# Patient Record
Sex: MALE | Race: WHITE | NOT HISPANIC OR LATINO | Employment: OTHER | URBAN - METROPOLITAN AREA
[De-identification: names, ages, dates, MRNs, and addresses within clinical notes are randomized per-mention and may not be internally consistent; named-entity substitution may affect disease eponyms.]

---

## 2021-09-11 ENCOUNTER — TELEPHONE (OUTPATIENT)
Dept: CARDIOLOGY | Facility: MEDICAL CENTER | Age: 86
End: 2021-09-11

## 2021-09-11 ENCOUNTER — HOSPITAL ENCOUNTER (INPATIENT)
Facility: MEDICAL CENTER | Age: 86
LOS: 3 days | DRG: 308 | End: 2021-09-16
Attending: HOSPITALIST | Admitting: FAMILY MEDICINE
Payer: MEDICARE

## 2021-09-11 DIAGNOSIS — R00.1 SYMPTOMATIC BRADYCARDIA: ICD-10-CM

## 2021-09-11 DIAGNOSIS — M62.81 GENERALIZED MUSCLE WEAKNESS: ICD-10-CM

## 2021-09-11 PROBLEM — E03.9 HYPOTHYROIDISM: Status: ACTIVE | Noted: 2021-09-11

## 2021-09-11 PROBLEM — I10 ESSENTIAL HYPERTENSION: Status: ACTIVE | Noted: 2021-09-11

## 2021-09-11 PROBLEM — N40.0 BENIGN PROSTATIC HYPERPLASIA: Status: ACTIVE | Noted: 2021-09-11

## 2021-09-11 PROBLEM — E87.1 HYPONATREMIA: Status: ACTIVE | Noted: 2021-09-11

## 2021-09-11 PROCEDURE — A9270 NON-COVERED ITEM OR SERVICE: HCPCS | Performed by: STUDENT IN AN ORGANIZED HEALTH CARE EDUCATION/TRAINING PROGRAM

## 2021-09-11 PROCEDURE — 700102 HCHG RX REV CODE 250 W/ 637 OVERRIDE(OP): Performed by: STUDENT IN AN ORGANIZED HEALTH CARE EDUCATION/TRAINING PROGRAM

## 2021-09-11 PROCEDURE — 99220 PR INITIAL OBSERVATION CARE,LEVL III: CPT | Performed by: STUDENT IN AN ORGANIZED HEALTH CARE EDUCATION/TRAINING PROGRAM

## 2021-09-11 PROCEDURE — G0378 HOSPITAL OBSERVATION PER HR: HCPCS

## 2021-09-11 RX ORDER — FINASTERIDE 5 MG/1
5 TABLET, FILM COATED ORAL DAILY
Status: DISCONTINUED | OUTPATIENT
Start: 2021-09-12 | End: 2021-09-16 | Stop reason: HOSPADM

## 2021-09-11 RX ORDER — AMIODARONE HYDROCHLORIDE 200 MG/1
200 TABLET ORAL DAILY
Status: ON HOLD | COMMUNITY
End: 2021-09-16

## 2021-09-11 RX ORDER — LOSARTAN POTASSIUM 50 MG/1
50 TABLET ORAL
Status: DISCONTINUED | OUTPATIENT
Start: 2021-09-12 | End: 2021-09-13

## 2021-09-11 RX ORDER — LEVOTHYROXINE SODIUM 112 UG/1
112 TABLET ORAL
Status: DISCONTINUED | OUTPATIENT
Start: 2021-09-12 | End: 2021-09-16 | Stop reason: HOSPADM

## 2021-09-11 RX ORDER — HYDROMORPHONE HYDROCHLORIDE 1 MG/ML
0.25 INJECTION, SOLUTION INTRAMUSCULAR; INTRAVENOUS; SUBCUTANEOUS
Status: DISCONTINUED | OUTPATIENT
Start: 2021-09-11 | End: 2021-09-13

## 2021-09-11 RX ORDER — OXYCODONE HYDROCHLORIDE 5 MG/1
2.5 TABLET ORAL
Status: DISCONTINUED | OUTPATIENT
Start: 2021-09-11 | End: 2021-09-13

## 2021-09-11 RX ORDER — POLYETHYLENE GLYCOL 3350 17 G/17G
1 POWDER, FOR SOLUTION ORAL
Status: DISCONTINUED | OUTPATIENT
Start: 2021-09-11 | End: 2021-09-16 | Stop reason: HOSPADM

## 2021-09-11 RX ORDER — ENALAPRILAT 1.25 MG/ML
1.25 INJECTION INTRAVENOUS EVERY 6 HOURS PRN
Status: DISCONTINUED | OUTPATIENT
Start: 2021-09-11 | End: 2021-09-16 | Stop reason: HOSPADM

## 2021-09-11 RX ORDER — TAMSULOSIN HYDROCHLORIDE 0.4 MG/1
0.4 CAPSULE ORAL
Status: DISCONTINUED | OUTPATIENT
Start: 2021-09-12 | End: 2021-09-16 | Stop reason: HOSPADM

## 2021-09-11 RX ORDER — OMEPRAZOLE 20 MG/1
20 CAPSULE, DELAYED RELEASE ORAL DAILY
Status: DISCONTINUED | OUTPATIENT
Start: 2021-09-11 | End: 2021-09-16 | Stop reason: HOSPADM

## 2021-09-11 RX ORDER — LABETALOL HYDROCHLORIDE 5 MG/ML
10 INJECTION, SOLUTION INTRAVENOUS EVERY 4 HOURS PRN
Status: DISCONTINUED | OUTPATIENT
Start: 2021-09-11 | End: 2021-09-15

## 2021-09-11 RX ORDER — CLONIDINE HYDROCHLORIDE 0.1 MG/1
0.1 TABLET ORAL TWICE DAILY
Status: DISCONTINUED | OUTPATIENT
Start: 2021-09-11 | End: 2021-09-16 | Stop reason: HOSPADM

## 2021-09-11 RX ORDER — ASPIRIN 81 MG/1
81 TABLET, CHEWABLE ORAL DAILY
Status: DISCONTINUED | OUTPATIENT
Start: 2021-09-12 | End: 2021-09-16 | Stop reason: HOSPADM

## 2021-09-11 RX ORDER — OXYCODONE HYDROCHLORIDE 5 MG/1
5 TABLET ORAL
Status: DISCONTINUED | OUTPATIENT
Start: 2021-09-11 | End: 2021-09-13

## 2021-09-11 RX ORDER — AMIODARONE HYDROCHLORIDE 200 MG/1
200 TABLET ORAL DAILY
Status: DISCONTINUED | OUTPATIENT
Start: 2021-09-12 | End: 2021-09-12

## 2021-09-11 RX ORDER — BISACODYL 10 MG
10 SUPPOSITORY, RECTAL RECTAL
Status: DISCONTINUED | OUTPATIENT
Start: 2021-09-11 | End: 2021-09-16 | Stop reason: HOSPADM

## 2021-09-11 RX ORDER — ONDANSETRON 4 MG/1
4 TABLET, ORALLY DISINTEGRATING ORAL EVERY 4 HOURS PRN
Status: DISCONTINUED | OUTPATIENT
Start: 2021-09-11 | End: 2021-09-16 | Stop reason: HOSPADM

## 2021-09-11 RX ORDER — ACETAMINOPHEN 325 MG/1
650 TABLET ORAL EVERY 6 HOURS PRN
Status: DISCONTINUED | OUTPATIENT
Start: 2021-09-11 | End: 2021-09-16 | Stop reason: HOSPADM

## 2021-09-11 RX ORDER — ONDANSETRON 2 MG/ML
4 INJECTION INTRAMUSCULAR; INTRAVENOUS EVERY 4 HOURS PRN
Status: DISCONTINUED | OUTPATIENT
Start: 2021-09-11 | End: 2021-09-16 | Stop reason: HOSPADM

## 2021-09-11 RX ORDER — HEPARIN SODIUM 5000 [USP'U]/ML
5000 INJECTION, SOLUTION INTRAVENOUS; SUBCUTANEOUS EVERY 8 HOURS
Status: DISCONTINUED | OUTPATIENT
Start: 2021-09-12 | End: 2021-09-16 | Stop reason: HOSPADM

## 2021-09-11 RX ORDER — AMOXICILLIN 250 MG
2 CAPSULE ORAL 2 TIMES DAILY
Status: DISCONTINUED | OUTPATIENT
Start: 2021-09-12 | End: 2021-09-16 | Stop reason: HOSPADM

## 2021-09-11 RX ADMIN — OMEPRAZOLE 20 MG: 20 CAPSULE, DELAYED RELEASE ORAL at 22:48

## 2021-09-11 RX ADMIN — CLONIDINE HYDROCHLORIDE 0.1 MG: 0.1 TABLET ORAL at 22:35

## 2021-09-11 ASSESSMENT — COGNITIVE AND FUNCTIONAL STATUS - GENERAL
DAILY ACTIVITIY SCORE: 24
CLIMB 3 TO 5 STEPS WITH RAILING: A LITTLE
MOBILITY SCORE: 19
WALKING IN HOSPITAL ROOM: A LITTLE
TURNING FROM BACK TO SIDE WHILE IN FLAT BAD: A LITTLE
SUGGESTED CMS G CODE MODIFIER MOBILITY: CK
SUGGESTED CMS G CODE MODIFIER DAILY ACTIVITY: CH
STANDING UP FROM CHAIR USING ARMS: A LITTLE
MOVING FROM LYING ON BACK TO SITTING ON SIDE OF FLAT BED: A LITTLE

## 2021-09-11 ASSESSMENT — ENCOUNTER SYMPTOMS
CONSTIPATION: 0
ABDOMINAL PAIN: 0
BLOOD IN STOOL: 0
WEAKNESS: 0
BACK PAIN: 0
INSOMNIA: 0
DOUBLE VISION: 0
VOMITING: 0
CHILLS: 0
BRUISES/BLEEDS EASILY: 0
DEPRESSION: 0
NAUSEA: 0
FEVER: 0
COUGH: 0
SORE THROAT: 0
HEARTBURN: 0
HEADACHES: 0
WHEEZING: 0
SHORTNESS OF BREATH: 0
NECK PAIN: 0
BLURRED VISION: 0
DIZZINESS: 1
LOSS OF CONSCIOUSNESS: 0
FOCAL WEAKNESS: 0
DIARRHEA: 0
PALPITATIONS: 1

## 2021-09-11 NOTE — PROGRESS NOTES
TRIAGE OFFICER ADMISSION ACCEPTANCE NOTE:     - I spoke and discussed the case with the ER physician, Dr. Calhoun  - This is a 89-year-old male with a past medical history of atrial fibrillation, hypertension who presents to the hospital at [facility] with symptomatic bradycardia.  Apparently patient's been feeling lightheaded and dizziness.  He has had a history of bradycardia in the past that was worked up by cardiology as an outpatient.  Currently patient is not receiving any AV blocking medications.  EKG found sinus bradycardia at a rate between 40s to 50s.  There is no evidence of heart block or bundle-branch block on EKG.  On telemetry monitor sinus pauses were not found.  Outlying facility does not have access to echocardiogram or cardiology.  Patient is not hypoxic and electrolytes are within normal limits.  He is Covid positive but has received the Covid vaccine.  Currently patient is asymptomatic from Covid.  I spoke to Dr. Flaherty from cardiology who agreed to transfer the patient for cardiac evaluation.  Patient denies shortness of breath, chest pain, nausea, diaphoresis, fever, cough.  - Hospitalist will be admitting the patient.  He will place his admission orders himself.  - Please call admitting physician for full admission orders, and cross-coverage issues on patient's arrival to the unit.

## 2021-09-11 NOTE — TELEPHONE ENCOUNTER
Discussed with the hospitalist regarding the case. According to Dr. Prabhakar, the patient has symptomatic bradycardia (lightheadeness) with HR in 40s-50s. The patient has history of afib.  The outside facility requests transfer for cardiology consult.  On telemetry, per dr. Prabhakar, there were not any significant high degree AV blocks or pauses thus far, unclear what rhythm.  No echocardiogram available. We are unclear whether the patient has chronotropic competence or has orthostatic hypotension.  Recommend these measures if they are able to obtain the the outside facility.  The patient was accepted for further workup given limited resources at the hospital.  However, at the time of the call, it was not revealed to me that the patient was covid positive. Upon reviewing the case, I called the transfer center to let them know that if he is in fact covid positive, pacemaker placement could possibly be delayed until infection resolves. Can consider discharge with [employer] for real time monitoring.  Transfer center will contact the hospital.

## 2021-09-12 LAB
ALBUMIN SERPL BCP-MCNC: 3.2 G/DL (ref 3.2–4.9)
ALBUMIN/GLOB SERPL: 1.2 G/DL
ALP SERPL-CCNC: 68 U/L (ref 30–99)
ALT SERPL-CCNC: 53 U/L (ref 2–50)
ANION GAP SERPL CALC-SCNC: 12 MMOL/L (ref 7–16)
AST SERPL-CCNC: 36 U/L (ref 12–45)
BILIRUB SERPL-MCNC: 0.5 MG/DL (ref 0.1–1.5)
BUN SERPL-MCNC: 11 MG/DL (ref 8–22)
CALCIUM SERPL-MCNC: 8.5 MG/DL (ref 8.5–10.5)
CHLORIDE SERPL-SCNC: 89 MMOL/L (ref 96–112)
CO2 SERPL-SCNC: 22 MMOL/L (ref 20–33)
CREAT SERPL-MCNC: 0.62 MG/DL (ref 0.5–1.4)
ERYTHROCYTE [DISTWIDTH] IN BLOOD BY AUTOMATED COUNT: 46.6 FL (ref 35.9–50)
GLOBULIN SER CALC-MCNC: 2.7 G/DL (ref 1.9–3.5)
GLUCOSE SERPL-MCNC: 88 MG/DL (ref 65–99)
HCT VFR BLD AUTO: 33.6 % (ref 42–52)
HGB BLD-MCNC: 11.4 G/DL (ref 14–18)
MAGNESIUM SERPL-MCNC: 1.8 MG/DL (ref 1.5–2.5)
MCH RBC QN AUTO: 32.9 PG (ref 27–33)
MCHC RBC AUTO-ENTMCNC: 33.9 G/DL (ref 33.7–35.3)
MCV RBC AUTO: 96.8 FL (ref 81.4–97.8)
PLATELET # BLD AUTO: 215 K/UL (ref 164–446)
PMV BLD AUTO: 8.7 FL (ref 9–12.9)
POTASSIUM SERPL-SCNC: 4 MMOL/L (ref 3.6–5.5)
PROT SERPL-MCNC: 5.9 G/DL (ref 6–8.2)
RBC # BLD AUTO: 3.47 M/UL (ref 4.7–6.1)
SODIUM SERPL-SCNC: 123 MMOL/L (ref 135–145)
TROPONIN T SERPL-MCNC: 26 NG/L (ref 6–19)
TROPONIN T SERPL-MCNC: 29 NG/L (ref 6–19)
TSH SERPL DL<=0.005 MIU/L-ACNC: 2.47 UIU/ML (ref 0.38–5.33)
WBC # BLD AUTO: 5.7 K/UL (ref 4.8–10.8)

## 2021-09-12 PROCEDURE — 96365 THER/PROPH/DIAG IV INF INIT: CPT

## 2021-09-12 PROCEDURE — 96372 THER/PROPH/DIAG INJ SC/IM: CPT

## 2021-09-12 PROCEDURE — 83735 ASSAY OF MAGNESIUM: CPT

## 2021-09-12 PROCEDURE — 36415 COLL VENOUS BLD VENIPUNCTURE: CPT

## 2021-09-12 PROCEDURE — 700111 HCHG RX REV CODE 636 W/ 250 OVERRIDE (IP): Performed by: STUDENT IN AN ORGANIZED HEALTH CARE EDUCATION/TRAINING PROGRAM

## 2021-09-12 PROCEDURE — A9270 NON-COVERED ITEM OR SERVICE: HCPCS | Performed by: STUDENT IN AN ORGANIZED HEALTH CARE EDUCATION/TRAINING PROGRAM

## 2021-09-12 PROCEDURE — 700102 HCHG RX REV CODE 250 W/ 637 OVERRIDE(OP): Performed by: STUDENT IN AN ORGANIZED HEALTH CARE EDUCATION/TRAINING PROGRAM

## 2021-09-12 PROCEDURE — 96366 THER/PROPH/DIAG IV INF ADDON: CPT

## 2021-09-12 PROCEDURE — 80053 COMPREHEN METABOLIC PANEL: CPT

## 2021-09-12 PROCEDURE — 84443 ASSAY THYROID STIM HORMONE: CPT

## 2021-09-12 PROCEDURE — G0378 HOSPITAL OBSERVATION PER HR: HCPCS

## 2021-09-12 PROCEDURE — 84484 ASSAY OF TROPONIN QUANT: CPT

## 2021-09-12 PROCEDURE — 700111 HCHG RX REV CODE 636 W/ 250 OVERRIDE (IP): Performed by: HEALTH CARE PROVIDER

## 2021-09-12 PROCEDURE — 99204 OFFICE O/P NEW MOD 45 MIN: CPT | Performed by: STUDENT IN AN ORGANIZED HEALTH CARE EDUCATION/TRAINING PROGRAM

## 2021-09-12 PROCEDURE — 85027 COMPLETE CBC AUTOMATED: CPT

## 2021-09-12 PROCEDURE — 96375 TX/PRO/DX INJ NEW DRUG ADDON: CPT

## 2021-09-12 RX ORDER — MAGNESIUM SULFATE HEPTAHYDRATE 40 MG/ML
2 INJECTION, SOLUTION INTRAVENOUS ONCE
Status: COMPLETED | OUTPATIENT
Start: 2021-09-12 | End: 2021-09-12

## 2021-09-12 RX ADMIN — HEPARIN SODIUM 5000 UNITS: 5000 INJECTION, SOLUTION INTRAVENOUS; SUBCUTANEOUS at 05:56

## 2021-09-12 RX ADMIN — ASPIRIN 81 MG: 81 TABLET, CHEWABLE ORAL at 05:56

## 2021-09-12 RX ADMIN — CLONIDINE HYDROCHLORIDE 0.1 MG: 0.1 TABLET ORAL at 05:56

## 2021-09-12 RX ADMIN — MAGNESIUM SULFATE IN WATER 2 G: 40 INJECTION, SOLUTION INTRAVENOUS at 06:03

## 2021-09-12 RX ADMIN — LEVOTHYROXINE SODIUM 112 MCG: 0.11 TABLET ORAL at 05:56

## 2021-09-12 RX ADMIN — DOCUSATE SODIUM 50 MG AND SENNOSIDES 8.6 MG 2 TABLET: 8.6; 5 TABLET, FILM COATED ORAL at 16:50

## 2021-09-12 RX ADMIN — FINASTERIDE 5 MG: 5 TABLET, FILM COATED ORAL at 05:56

## 2021-09-12 RX ADMIN — TAMSULOSIN HYDROCHLORIDE 0.4 MG: 0.4 CAPSULE ORAL at 09:16

## 2021-09-12 RX ADMIN — LOSARTAN POTASSIUM 50 MG: 50 TABLET, FILM COATED ORAL at 05:56

## 2021-09-12 RX ADMIN — HEPARIN SODIUM 5000 UNITS: 5000 INJECTION, SOLUTION INTRAVENOUS; SUBCUTANEOUS at 15:31

## 2021-09-12 RX ADMIN — ENALAPRILAT 1.25 MG: 1.25 INJECTION INTRAVENOUS at 01:25

## 2021-09-12 RX ADMIN — AMIODARONE HYDROCHLORIDE 200 MG: 200 TABLET ORAL at 05:56

## 2021-09-12 RX ADMIN — HEPARIN SODIUM 5000 UNITS: 5000 INJECTION, SOLUTION INTRAVENOUS; SUBCUTANEOUS at 21:43

## 2021-09-12 RX ADMIN — CLONIDINE HYDROCHLORIDE 0.1 MG: 0.1 TABLET ORAL at 16:50

## 2021-09-12 RX ADMIN — OMEPRAZOLE 20 MG: 20 CAPSULE, DELAYED RELEASE ORAL at 21:43

## 2021-09-12 ASSESSMENT — FIBROSIS 4 INDEX: FIB4 SCORE: 2.07

## 2021-09-12 NOTE — H&P
Hospital Medicine History & Physical Note    Date of Service  9/11/2021    Primary Care Physician  No primary care provider on file.    Consultants  None    Code Status  DNAR/DNI    Chief Complaint  Dizzy    History of Presenting Illness  Jerel Landrum is a 89 y.o. male who presented 9/11/2021 from outside facility [facility] with complaints of dizziness.  He was noted to be bradycardic at outside facility and sinus bradycardia per EKG.  He has had telemetry monitoring for the past 2 days and no episodes of heart block and not on any medication that cause heart block.  Patient is on amiodarone for atrial fibrillation and does not take any anticoagulation outside of daily aspirin.  He states he is fully vaccinated with COVID-19 Pfizer vaccine however was noted to have Covid at outside facility without any symptoms.  Of note, patient states that he does have some mild bilateral lower extremity edema however no orthopnea, paroxysmal nocturnal dyspnea, shortness of breath on exertion or chest pain.  He otherwise denies the following ROS: Pleuritic/substernal chest pain, diaphoresis, anosmia, ageusia, hemoptysis, cough with sputum production, nausea, vomiting, fever, chills, constipation, diarrhea, melena, hematochezia, dysuria, hematuria, syncope, visual changes, loss of consciousness, paresthesias.    Vital signs at time presentation were as follows: 97.8, 55, 15, 176/73, 94% room air.    Outside facility labs were obtained and CMP reveals hyponatremia of 122, hypochloremia of 92, albumin 3.2 mild elevation in ALT at 54 and otherwise unremarkable.  CBC was performed and reveals mild normocytic anemia with hemoglobin 9.9, hematocrit 29.0 with MCV of 97 and otherwise relatively unremarkable.  PT/INR within normal limits as well as APTT.  Urinalysis unremarkable outside facility.  Urine drug screen unremarkable as well.  Chest x-ray reveals right basilar atelectasis and likely early pneumonia.   Twelve-lead EKG revealed sinus bradycardia with QTC of 424 and without ischemic changes.  Good R wave progression in precordial leads and no ST segment elevations or depressions or heart blocks appreciated.    Patient in agreement with observation admission for symptomatic sinus bradycardia.  He agrees to DO NOT RESUSCITATE DO NOT INTUBATE CODE STATUS at time of evaluation and alert and oriented x4.  He will be optimized in cardiology telemetry unit and continuation of optimization on medical gutierrez floor.    I discussed the plan of care with patient.    Review of Systems  Review of Systems   Constitutional: Negative for chills and fever.   HENT: Negative for congestion and sore throat.    Eyes: Negative for blurred vision and double vision.   Respiratory: Negative for cough, shortness of breath and wheezing.    Cardiovascular: Positive for palpitations. Negative for chest pain.   Gastrointestinal: Negative for abdominal pain, blood in stool, constipation, diarrhea, heartburn, melena, nausea and vomiting.   Genitourinary: Negative for dysuria and frequency.   Musculoskeletal: Negative for back pain and neck pain.   Skin: Negative for itching and rash.   Neurological: Positive for dizziness. Negative for focal weakness, loss of consciousness, weakness and headaches.   Endo/Heme/Allergies: Negative for environmental allergies. Does not bruise/bleed easily.   Psychiatric/Behavioral: Negative for depression. The patient does not have insomnia.        Past Medical History   has a past medical history of Atrial fibrillation (HCC), BPH (benign prostatic hyperplasia), Hypertension, Hypothyroid, and Vitamin D deficiency.    Surgical History   has no past surgical history on file.     Family History  family history includes No Known Problems in his father and mother.   Family history reviewed with patient. There is no family history that is pertinent to the chief complaint.     Social History   reports that he has never smoked.  He does not have any smokeless tobacco history on file. He reports previous alcohol use. He reports that he does not use drugs.    Allergies  Not on File    Medications  Prior to Admission Medications   Prescriptions Last Dose Informant Patient Reported? Taking?   amiodarone (CORDARONE) 200 MG Tab 9/10/2021 at 0900  Yes Yes   Sig: Take 200 mg by mouth every day.      Facility-Administered Medications: None       Physical Exam  Temp:  [36.6 °C (97.8 °F)] 36.6 °C (97.8 °F)  Pulse:  [55] 55  Resp:  [15] 15  BP: (176)/(73) 176/73  SpO2:  [94 %] 94 %  Blood Pressure : (!) 176/73   Temperature: 36.6 °C (97.8 °F)   Pulse: (!) 55   Respiration: 15   Pulse Oximetry: 94 %       Physical Exam  Vitals reviewed.   Constitutional:       Appearance: Normal appearance. He is normal weight. He is not toxic-appearing or diaphoretic.   HENT:      Head: Normocephalic and atraumatic.      Mouth/Throat:      Mouth: Mucous membranes are moist.      Pharynx: Oropharynx is clear. No oropharyngeal exudate or posterior oropharyngeal erythema.      Comments: Poor oral dentition  Eyes:      General: No scleral icterus.     Extraocular Movements: Extraocular movements intact.      Pupils: Pupils are equal, round, and reactive to light.      Comments: Pale conjunctiva   Neck:      Vascular: No carotid bruit.   Cardiovascular:      Rate and Rhythm: Regular rhythm. Bradycardia present.      Pulses: Normal pulses.      Heart sounds: Normal heart sounds. No murmur heard.   No friction rub. No gallop.    Pulmonary:      Effort: Pulmonary effort is normal.      Breath sounds: Normal breath sounds. No wheezing, rhonchi or rales.   Abdominal:      General: Bowel sounds are normal. There is no distension.      Palpations: Abdomen is soft. There is no mass.      Tenderness: There is no abdominal tenderness. There is no guarding or rebound.      Hernia: No hernia is present.   Musculoskeletal:         General: Normal range of motion.      Cervical back:  Normal range of motion and neck supple. No muscular tenderness.      Right lower leg: Edema present.      Left lower leg: Edema present.   Lymphadenopathy:      Cervical: No cervical adenopathy.   Skin:     General: Skin is warm and dry.      Capillary Refill: Capillary refill takes less than 2 seconds.      Coloration: Skin is not pale.      Findings: No erythema or rash.   Neurological:      General: No focal deficit present.      Mental Status: He is alert and oriented to person, place, and time. Mental status is at baseline.      Cranial Nerves: No cranial nerve deficit.      Sensory: No sensory deficit.      Motor: No weakness.   Psychiatric:         Mood and Affect: Mood normal.         Behavior: Behavior normal.         Thought Content: Thought content normal.         Judgment: Judgment normal.         Laboratory:          No results for input(s): ALTSGPT, ASTSGOT, ALKPHOSPHAT, TBILIRUBIN, DBILIRUBIN, GAMMAGT, AMYLASE, LIPASE, ALB, PREALBUMIN, GLUCOSE in the last 72 hours.      No results for input(s): NTPROBNP in the last 72 hours.      No results for input(s): TROPONINT in the last 72 hours.    Imaging:  EC-ECHOCARDIOGRAM COMPLETE W/O CONT    (Results Pending)       I reviewed and interpreted the above twelve-lead EKG and do appreciate sinus bradycardia without AV block.  QTc within normal limits and good R wave progression in precordial leads.  No indication of ST segment elevation or depression or T wave inversions.      Assessment/Plan:  I anticipate this patient is appropriate for observation status at this time.    * Symptomatic bradycardia- (present on admission)  Assessment & Plan  Transferred from outside facility for cardiology services and echocardiogram  Echocardiogram ordered  Consult cardiology in a.m. for possible pacemaker implant  Twelve-lead EKG as seen above reveals sinus bradycardia without heart block or prolonged QTC or ischemic changes  No indication for atropine at this time  May want  to consider change of formulation of antihypertensives to include hydralazine for rebound tachycardic effect      Hyponatremia- (present on admission)  Assessment & Plan  Likely secondary to hypervolemic hyponatremia with evidence of bilateral lower extremity edema  No indication for hypertonic saline at this time  Fluid restriction at this time  Repeat CMP in a.m.    Benign prostatic hyperplasia- (present on admission)  Assessment & Plan  Continue tamsulosin 0.4 mg p.o. daily  Continue finasteride 5 mg p.o. daily    Hypothyroidism- (present on admission)  Assessment & Plan  TSH ordered and pending  Free T4 ordered and pending  Continue Synthroid 112 mcg p.o. daily  Follow-up outpatient PCP and endocrinologist after discharge.    Essential hypertension- (present on admission)  Assessment & Plan  Continue clonidine 0.1 mg p.o. twice daily  Continue losartan 50 mg p.o. daily  Consider hydralazine for dual benefit of rebound tachycardia and antihypertensive  2 g sodium restricted diet      VTE prophylaxis: enoxaparin ppx

## 2021-09-12 NOTE — PROGRESS NOTES
Head Redness  Ears WDL  Nose Redness and Scab  Mouth WDL  Neck WDL  Breast/Chest WDL  Shoulder Blades Redness  Spine Redness  (R) Arm/Elbow/Hand Redness, Abrasion and Scab  (L) Arm/Elbow/Hand Redness and Bruising  Abdomen WDL  Groin WDL  Scrotum/Coccyx/Buttocks Redness and Blanching  (R) Leg Redness and Bruising  (L) Leg Redness and Bruising  (R) Heel/Foot/Toe Redness and Bruising  (L) Heel/Foot/Toe Redness and Bruising          Devices In Places Tele Box      Interventions In Place N/A    Possible Skin Injury No    Pictures Uploaded Into Epic No, needs to be completed  Wound Consult Placed N/A  RN Wound Prevention Protocol Ordered No

## 2021-09-12 NOTE — PROGRESS NOTES
Pt arrived to [facility] on a gurney via EMS. Patient is A&Ox4 and awake in bed. Patient is in no signs of distress, unlabored breathing on RA and denies pain at this time. Patient was updated on the plan of care for the day. Call light within reach, bed in low position, 2 side rails up. Fall precautions in place, tele box is on, and confirmed with V[facility]a that cardiac rhythm is being monitored. Will round hourly.

## 2021-09-12 NOTE — PROGRESS NOTES
Report received from night shift RN, assumed care of pt. Pt is A&Ox4. Tele box on: yes. O2: RA.   All fall precautions and hourly rounding in place. Will continue to monitor.    Covid-19 surge in effect.

## 2021-09-12 NOTE — PROGRESS NOTES
Pt's HR sustaining in mid-40s per monitor room. Pt reporting some dizziness, no SOB at this time. Per pt dizziness is not worse than it was before but he notices that it persists when he tries to close his eyes and sleep. Attempted to contact MD but no providers signed into [employer] at this time. Will continue to monitor.

## 2021-09-12 NOTE — CONSULTS
Reason for Consult:  Asked by Dr Andrew Presley M.D. to see this patient with symptomatic bradycardia.  Patient's PCP: No primary care provider on file.    CC: No chief complaint on file.      HPI: Jerel Landrum is a 89-year-old man with history of paroxysmal atrial fibrillation on amiodarone who presented to an outside hospital with complaints of dizziness.  The patient was found to be bradycardic with sinus bradycardia on EKG.  He had telemetry monitoring for the past 2 days with no episodes of heart block per chart.  The patient is fully vaccinated with COVID-19 Pfizer vaccine, but was tested positive for Covid at the outside hospital.  He denies any infectious symptoms.    The patient reports that he feels well without any chest pain or shortness of breath.  However, he does report lightheadedness.  He denies any syncope.  Of note, about 6 weeks ago, he tripped and fell but he believed it was a mechanical fall and did not think he lost consciousness.    The patient has a monitor on telemetry since arrival on 9/11/2021 starting at 9:50 PM, the patient has been bradycardic with rate in 40s (not lower than 44) to 50s, no high degree AV blocks or pauses.    Medications / Drug list prior to admission:  No current facility-administered medications on file prior to encounter.     Current Outpatient Medications on File Prior to Encounter   Medication Sig Dispense Refill   • amiodarone (CORDARONE) 200 MG Tab Take 200 mg by mouth every day.         Current list of administered Medications:    Current Facility-Administered Medications:   •  magnesium sulfate IVPB premix 2 g, 2 g, Intravenous, Once, Shayne Mccallum M.D., Last Rate: 25 mL/hr at 09/12/21 0603, 2 g at 09/12/21 0603  •  amiodarone (Cordarone) tablet 200 mg, 200 mg, Oral, DAILY, Miguel Alberts D.O., 200 mg at 09/12/21 0556  •  senna-docusate (PERICOLACE or SENOKOT S) 8.6-50 MG per tablet 2 Tablet, 2 Tablet, Oral, BID **AND** polyethylene  glycol/lytes (MIRALAX) PACKET 1 Packet, 1 Packet, Oral, QDAY PRN **AND** magnesium hydroxide (MILK OF MAGNESIA) suspension 30 mL, 30 mL, Oral, QDAY PRN **AND** bisacodyl (DULCOLAX) suppository 10 mg, 10 mg, Rectal, QDAY PRN, Miguel Alberts, D.O.  •  heparin injection 5,000 Units, 5,000 Units, Subcutaneous, Q8HRS, Miguel Alberts D.O., 5,000 Units at 09/12/21 0556  •  acetaminophen (Tylenol) tablet 650 mg, 650 mg, Oral, Q6HRS PRN, Miguel Alberts D.O.  •  Notify provider if pain remains uncontrolled, , , CONTINUOUS **AND** Use the Numeric Rating Scale (NRS), Schulte-Baker Faces (WBF), or FLACC on regular floors and Critical-Care Pain Observation Tool (CPOT) on ICUs/Trauma to assess pain, , , CONTINUOUS **AND** Pulse Ox, , , CONTINUOUS **AND** Pharmacy Consult Request ...Pain Management Review 1 Each, 1 Each, Other, PHARMACY TO DOSE **AND** If patient difficult to arouse and/or has respiratory depression (respiratory rate of 10 or less), stop any opiates that are currently infusing and call a Rapid Response., , , CONTINUOUS, Miguel Alberts D.O.  •  oxyCODONE immediate-release (ROXICODONE) tablet 2.5 mg, 2.5 mg, Oral, Q3HRS PRN **OR** oxyCODONE immediate-release (ROXICODONE) tablet 5 mg, 5 mg, Oral, Q3HRS PRN **OR** HYDROmorphone (Dilaudid) injection 0.25 mg, 0.25 mg, Intravenous, Q3HRS PRN, Miguel Alberts D.O.  •  enalaprilat (Vasotec) injection 1.25 mg 1 mL, 1.25 mg, Intravenous, Q6HRS PRN, Miguel Alberts D.O., 1.25 mg at 09/12/21 0125  •  labetalol (NORMODYNE/TRANDATE) injection 10 mg, 10 mg, Intravenous, Q4HRS PRN, LINO Lobato.O.  •  ondansetron (ZOFRAN) syringe/vial injection 4 mg, 4 mg, Intravenous, Q4HRS PRN, LINO Lobato.O.  •  ondansetron (ZOFRAN ODT) dispertab 4 mg, 4 mg, Oral, Q4HRS PRN, LINO Lobato.O.  •  aspirin (ASA) chewable tab 81 mg, 81 mg, Oral, DAILY, Miguel Alberts D.O., 81 mg at 09/12/21 0556  •  finasteride (PROSCAR) tablet 5 mg, 5 mg, Oral, DAILY,  "Miguel Alberts D.O., 5 mg at 09/12/21 0556  •  omeprazole (PRILOSEC) capsule 20 mg, 20 mg, Oral, DAILY, Miguel Alberts D.O., 20 mg at 09/11/21 2248  •  tamsulosin (FLOMAX) capsule 0.4 mg, 0.4 mg, Oral, AFTER BREAKFAST, LINO Lobato.O.  •  sertraline (Zoloft) tablet 25 mg, 25 mg, Oral, DAILY, Mgiuel Alberts D.O.  •  cloNIDine (CATAPRES) tablet 0.1 mg, 0.1 mg, Oral, TWICE DAILY, LINO Lobato.O., 0.1 mg at 09/12/21 0556  •  levothyroxine (SYNTHROID) tablet 112 mcg, 112 mcg, Oral, AM ES, LINO Lobato.O., 112 mcg at 09/12/21 0556  •  losartan (COZAAR) tablet 50 mg, 50 mg, Oral, Q DAY, Miguel Alberts D.O., 50 mg at 09/12/21 0556    Past Medical History:   Diagnosis Date   • Atrial fibrillation (HCC)    • BPH (benign prostatic hyperplasia)    • Hypertension    • Hypothyroid    • Vitamin D deficiency        History reviewed. No pertinent surgical history.    Family History   Problem Relation Age of Onset   • No Known Problems Mother    • No Known Problems Father      Patient family history was personally reviewed, no pertinent family history to current presentation    Social History     Tobacco Use   • Smoking status: Never Smoker   Substance Use Topics   • Alcohol use: Not Currently   • Drug use: Never       ALLERGIES:  No Known Allergies    Review of systems:  A complete review of symptoms was reviewed with patient. This is reviewed in H&P and PMH. ALL OTHERS reviewed and negative    Physical exam:  Patient Vitals for the past 24 hrs:   BP Temp Temp src Pulse Resp SpO2 Height Weight   09/12/21 0709 160/69 36.4 °C (97.5 °F) Oral (!) 57 15 95 % -- --   09/12/21 0351 (!) 169/74 36.6 °C (97.8 °F) Oral (!) 53 14 94 % -- --   09/12/21 0115 (!) 173/73 36.4 °C (97.6 °F) Oral (!) 54 14 95 % -- --   09/11/21 2225 (!) 176/73 -- -- -- -- -- -- --   09/11/21 2115 (!) 176/73 36.6 °C (97.8 °F) Oral (!) 55 15 94 % 1.727 m (5' 8\") 74.5 kg (164 lb 3.9 oz)     General: No acute distress.   EYES: no " jaundice  HEENT: OP clear   Neck: No bruits No JVD.   CVS: Bradycardic rate, regular rhythm. S1 + S2. No M/R/G  Resp: CTAB. No wheezing or crackles/rhonchi.  Abdomen: Soft, NT, ND,  Skin: Grossly nothing acute no obvious rashes  Neurological: Alert, Moves all extremities, no cranial nerve defects on limited exam  Extremities: Pulse 2+ in b/l LE.  No edema. No cyanosis.       Data:  Laboratory studies personally reviewed by me:  Recent Results (from the past 24 hour(s))   TSH    Collection Time: 09/12/21  3:48 AM   Result Value Ref Range    TSH 2.470 0.380 - 5.330 uIU/mL   CBC without Differential    Collection Time: 09/12/21  3:48 AM   Result Value Ref Range    WBC 5.7 4.8 - 10.8 K/uL    RBC 3.47 (L) 4.70 - 6.10 M/uL    Hemoglobin 11.4 (L) 14.0 - 18.0 g/dL    Hematocrit 33.6 (L) 42.0 - 52.0 %    MCV 96.8 81.4 - 97.8 fL    MCH 32.9 27.0 - 33.0 pg    MCHC 33.9 33.7 - 35.3 g/dL    RDW 46.6 35.9 - 50.0 fL    Platelet Count 215 164 - 446 K/uL    MPV 8.7 (L) 9.0 - 12.9 fL   Comp Metabolic Panel (CMP)    Collection Time: 09/12/21  3:48 AM   Result Value Ref Range    Sodium 123 (L) 135 - 145 mmol/L    Potassium 4.0 3.6 - 5.5 mmol/L    Chloride 89 (L) 96 - 112 mmol/L    Co2 22 20 - 33 mmol/L    Anion Gap 12.0 7.0 - 16.0    Glucose 88 65 - 99 mg/dL    Bun 11 8 - 22 mg/dL    Creatinine 0.62 0.50 - 1.40 mg/dL    Calcium 8.5 8.5 - 10.5 mg/dL    AST(SGOT) 36 12 - 45 U/L    ALT(SGPT) 53 (H) 2 - 50 U/L    Alkaline Phosphatase 68 30 - 99 U/L    Total Bilirubin 0.5 0.1 - 1.5 mg/dL    Albumin 3.2 3.2 - 4.9 g/dL    Total Protein 5.9 (L) 6.0 - 8.2 g/dL    Globulin 2.7 1.9 - 3.5 g/dL    A-G Ratio 1.2 g/dL   TROPONIN    Collection Time: 09/12/21  3:48 AM   Result Value Ref Range    Troponin T 26 (H) 6 - 19 ng/L   MAGNESIUM    Collection Time: 09/12/21  3:48 AM   Result Value Ref Range    Magnesium 1.8 1.5 - 2.5 mg/dL   ESTIMATED GFR    Collection Time: 09/12/21  3:48 AM   Result Value Ref Range    GFR If  >60 >60  mL/min/1.73 m 2    GFR If Non African American >60 >60 mL/min/1.73 m 2       Imaging:  EC-ECHOCARDIOGRAM COMPLETE W/O CONT    (Results Pending)           EKG 9/10/2021 from OSH: personally reviewed by me sinus bradycardia    All pertinent features of laboratory and imaging reviewed including primary images where applicable      Principal Problem:    Symptomatic bradycardia POA: Yes  Active Problems:    Essential hypertension POA: Yes    Hypothyroidism POA: Yes    Benign prostatic hyperplasia POA: Yes    Hyponatremia POA: Yes  Resolved Problems:    * No resolved hospital problems. *      Assessment / Plan:    Lightheadedness  Sinus bradycardia  Paroxysmal atrial fibrillation  Covid positive  Currently in sinus rhythm, bradycardic.  On telemetry, he remain with sinus rhythm heart rate 40s (lowest 44) to 50s without any pauses or significant AV blocks.  -Stop amiodarone, as that could lower heart rate.  We will plan to mail real-time 30-day event monitor (milabent) to monitor patient (we are arranging this).  If the patient goes into A. fib RVR off amiodarone, will need to consider pacemaker in order for the patient to tolerate amiodarone and/oral beta-blockade.  -Obtain echocardiogram to assess LVEF and any structural abnormalities.  -Continue to monitor on telemetry, if there are significant pauses or high degree AV block, please contact cardiology.  -[id number] 2 (age x2) -the patient has an outpatient cardiologist (Dr. Delarosa), can discuss with the outpatient cardiologist regarding anticoagulation.  The patient had a recent fall, defer decision to outpatient.    I personally discussed his case with  Dr Andrew Presley M.D.      It is my pleasure to participate in the care of Mr. Landrum.  Please do not hesitate to contact me with questions or concerns.    Cresencio Flaherty MD   Cardiologist [facility]    9/12/2021    Please note that this dictation was created using voice recognition  software. There may be errors I did not discover before finalizing the note.

## 2021-09-12 NOTE — ASSESSMENT & PLAN NOTE
Continue clonidine 0.1 mg p.o. twice daily  Continue losartan 50 mg p.o. daily  Consider hydralazine for dual benefit of rebound tachycardia and antihypertensive  2 g sodium restricted diet

## 2021-09-12 NOTE — PROGRESS NOTES
Attempted to confirm patient's medication reconciliation with paperwork from [facility]. Patient is A&Ox4. Patient states he does not take all of the medications listed on [facility]'s reconciliation; however cannot recall which medications he does take. Spoke with pharmacy and they stated a tech would come to [facility] to complete med rec as soon as possible.

## 2021-09-12 NOTE — ASSESSMENT & PLAN NOTE
Likely secondary to hypervolemic hyponatremia with evidence of bilateral lower extremity edema  No indication for hypertonic saline at this time  Fluid restriction at this time  Repeat CMP in a.m.

## 2021-09-12 NOTE — PROGRESS NOTES
Monitor summary: SB: 53-57  .16/.10/.48  (R) PVC; (R) PAC  Per strip from monitor room

## 2021-09-12 NOTE — ASSESSMENT & PLAN NOTE
Transferred from outside facility for cardiology services and echocardiogram  Echocardiogram ordered  Consult cardiology in a.m. for possible pacemaker implant  Twelve-lead EKG as seen above reveals sinus bradycardia without heart block or prolonged QTC or ischemic changes  No indication for atropine at this time  May want to consider change of formulation of antihypertensives to include hydralazine for rebound tachycardic effect

## 2021-09-12 NOTE — ASSESSMENT & PLAN NOTE
TSH ordered and pending  Free T4 ordered and pending  Continue Synthroid 112 mcg p.o. daily  Follow-up outpatient PCP and endocrinologist after discharge.

## 2021-09-12 NOTE — PROGRESS NOTES
[facility] [place] FAMILY MEDICINE PROGRESS NOTE     Attending: Deanna Maldonado M.D.  Senior Resident: Rehan Naidu (PGY-2)  Margarito Resident: Shayne Mccallum M.D. (PGY-1)  PATIENT: Jerel Landrum; [id number]; 9/6/1931    ID: 89 y.o. COVID + male with past medical history of atrial fibrillation, hypertension, hyperthroidism, BPH was admitted on 09/11/201 following transfer from [facility] for several month history of dizziness and recently discovered bradycardia    24 Hour Events: No acute events overnight. He has remained in sinus bradycardia with intermittent lightheadedness.    Subjective: Pt denies new concerns at this time. No chest pain, SOB, fever, chills, or abdominal pain. He continues to experience intermittent lightheadedness/dizziness but this has been stable for past 6 mo.    OBJECTIVE:  Temp:  [36.4 °C (97.5 °F)-36.6 °C (97.8 °F)] 36.4 °C (97.5 °F)  Pulse:  [53-57] 57  Resp:  [14-15] 15  BP: (160-176)/(69-74) 160/69  SpO2:  [94 %-95 %] 95 %  No intake or output data in the 24 hours ending 09/12/21 0844    PE:  Gen: No acute distress, resting comfortably in bed  HEENT: normocephalic, atraumatic, EOMI  Pulm: clear to auscultation bilaterally, no respiratory distress   Cardio: bradycardia, no M/R/G  Abdom: soft, nontender, nondistended, normoactive bowel sounds in all quadrants  Ext: No edema, 2+ pulses bilaterally  Neuro: Grossly intact    LABS:  Recent Labs     09/12/21  0348   WBC 5.7   RBC 3.47*   HEMOGLOBIN 11.4*   HEMATOCRIT 33.6*   MCV 96.8   MCH 32.9   RDW 46.6   PLATELETCT 215   MPV 8.7*     Recent Labs     09/12/21  0348   SODIUM 123*   POTASSIUM 4.0   CHLORIDE 89*   CO2 22   BUN 11   CREATININE 0.62   CALCIUM 8.5   MAGNESIUM 1.8   ALBUMIN 3.2     Estimated GFR/CRCL = Estimated Creatinine Clearance: 76.6 mL/min (by C-G formula based on SCr of 0.62 mg/dL).  Recent Labs     09/12/21  0348   GLUCOSE 88     Recent Labs     09/12/21  0348   ASTSGOT 36   ALTSGPT 53*   TBILIRUBIN 0.5   ALKPHOSPHAT 68   GLOBULIN 2.7              No results for input(s): INR, APTT, FIBRINOGEN in the last 72 hours.    Invalid input(s): DIMER    MICROBIOLOGY:   No results found for: BLOODCULTU, BLDCULT, BCHOLD     IMAGING:   EC-ECHOCARDIOGRAM COMPLETE W/O CONT    (Results Pending)       MEDS:  Current Facility-Administered Medications   Medication Last Admin   • senna-docusate (PERICOLACE or SENOKOT S) 8.6-50 MG per tablet 2 Tablet      And   • polyethylene glycol/lytes (MIRALAX) PACKET 1 Packet      And   • magnesium hydroxide (MILK OF MAGNESIA) suspension 30 mL      And   • bisacodyl (DULCOLAX) suppository 10 mg     • heparin injection 5,000 Units 5,000 Units at 09/12/21 0556   • acetaminophen (Tylenol) tablet 650 mg     • Pharmacy Consult Request ...Pain Management Review 1 Each     • oxyCODONE immediate-release (ROXICODONE) tablet 2.5 mg      Or   • oxyCODONE immediate-release (ROXICODONE) tablet 5 mg      Or   • HYDROmorphone (Dilaudid) injection 0.25 mg     • enalaprilat (Vasotec) injection 1.25 mg 1 mL 1.25 mg at 09/12/21 0125   • labetalol (NORMODYNE/TRANDATE) injection 10 mg     • ondansetron (ZOFRAN) syringe/vial injection 4 mg     • ondansetron (ZOFRAN ODT) dispertab 4 mg     • aspirin (ASA) chewable tab 81 mg 81 mg at 09/12/21 0556   • finasteride (PROSCAR) tablet 5 mg 5 mg at 09/12/21 0556   • omeprazole (PRILOSEC) capsule 20 mg 20 mg at 09/11/21 2248   • tamsulosin (FLOMAX) capsule 0.4 mg     • sertraline (Zoloft) tablet 25 mg     • cloNIDine (CATAPRES) tablet 0.1 mg 0.1 mg at 09/12/21 0556   • levothyroxine (SYNTHROID) tablet 112 mcg 112 mcg at 09/12/21 0556   • losartan (COZAAR) tablet 50 mg 50 mg at 09/12/21 0556       PROBLEM LIST:  No problems updated.    ASSESSMENT/PLAN: 89 y.o. male with past medical history of atrial fibrillation, hypertension, hyperthroidism, BPH was admitted for symptomatic bradycardia    #Bradycardia, symptomatic  Unknown duration but patient states that symptoms have been stable for > 6 months. Previously prescribed  amiodarone for afib but d/c per Cardiology due to possible involvement with bradycardia. No known history of CAD or ischemic events in recent past.  - Cardiology consulted and will provide treatment recs  - Hold amiodarone due to bradycardia  - TTE ordered for evaluation of structural cardiac abnormalities  - Monitor on telemetry    #Atrial fibrillation, chronic, rate controlled  Long standing history of atrial fibrillation and use of amiodarone for rate/rhythm control. Not currently on anticoagulation.  - Hold amiodarone per Cardiology  - Monitor on telemetry    #COVID + status  Tested positive prior to admission but remains asymptomatic. Received the vaccine. No respiratory support required at this time.  - CTM for respiratory symptoms  - Supplemental oxygen as needed  - Airborne precautions    #Hyponatremia  Mild hyponatremia with Na 123 which appears to be hypotonic hyponatremia. Unknown duration.  - Fluid restriction  - Repeat BMP on 09/13    #BPH  - Continue home finasteride, tamsulosin    #Hypothyroidism  TSH level on admission WNL.   - Continue home levothyroxine 112mcg daily    #HTN  BP elevated on admission and chronically per patient.  - Continue home clonidine 0.1mg BID and losartan 50mg daily  - PRN BP meds ordered as well     #Normocytic anemia, mild  Unknown duration and etiology.  - Monitor with regular CBC, may consider anemia workup once medically stable    #Prophylaxis  - Lovenox    #DISPOSITION  - Continued inpatient care for cardiac workup        Shayne Mccallum M.D.   PGY-1  [facility]

## 2021-09-13 ENCOUNTER — APPOINTMENT (OUTPATIENT)
Dept: CARDIOLOGY | Facility: MEDICAL CENTER | Age: 86
DRG: 308 | End: 2021-09-13
Attending: STUDENT IN AN ORGANIZED HEALTH CARE EDUCATION/TRAINING PROGRAM
Payer: MEDICARE

## 2021-09-13 LAB
ANION GAP SERPL CALC-SCNC: 5 MMOL/L (ref 7–16)
BASOPHILS # BLD AUTO: 0.4 % (ref 0–1.8)
BASOPHILS # BLD: 0.02 K/UL (ref 0–0.12)
BUN SERPL-MCNC: 14 MG/DL (ref 8–22)
CALCIUM SERPL-MCNC: 8.4 MG/DL (ref 8.5–10.5)
CHLORIDE SERPL-SCNC: 89 MMOL/L (ref 96–112)
CO2 SERPL-SCNC: 26 MMOL/L (ref 20–33)
CREAT SERPL-MCNC: 0.77 MG/DL (ref 0.5–1.4)
CREAT UR-MCNC: 28.26 MG/DL
EKG IMPRESSION: NORMAL
EOSINOPHIL # BLD AUTO: 0.03 K/UL (ref 0–0.51)
EOSINOPHIL NFR BLD: 0.5 % (ref 0–6.9)
ERYTHROCYTE [DISTWIDTH] IN BLOOD BY AUTOMATED COUNT: 44.7 FL (ref 35.9–50)
GLUCOSE SERPL-MCNC: 88 MG/DL (ref 65–99)
HCT VFR BLD AUTO: 32.3 % (ref 42–52)
HGB BLD-MCNC: 11.4 G/DL (ref 14–18)
IMM GRANULOCYTES # BLD AUTO: 0.12 K/UL (ref 0–0.11)
IMM GRANULOCYTES NFR BLD AUTO: 2.1 % (ref 0–0.9)
LV EJECT FRACT  99904: 65
LV EJECT FRACT MOD 2C 99903: 66.91
LV EJECT FRACT MOD 4C 99902: 68.98
LV EJECT FRACT MOD BP 99901: 65.99
LYMPHOCYTES # BLD AUTO: 0.54 K/UL (ref 1–4.8)
LYMPHOCYTES NFR BLD: 9.6 % (ref 22–41)
MAGNESIUM SERPL-MCNC: 1.9 MG/DL (ref 1.5–2.5)
MCH RBC QN AUTO: 33.5 PG (ref 27–33)
MCHC RBC AUTO-ENTMCNC: 35.3 G/DL (ref 33.7–35.3)
MCV RBC AUTO: 95 FL (ref 81.4–97.8)
MONOCYTES # BLD AUTO: 0.61 K/UL (ref 0–0.85)
MONOCYTES NFR BLD AUTO: 10.9 % (ref 0–13.4)
NEUTROPHILS # BLD AUTO: 4.29 K/UL (ref 1.82–7.42)
NEUTROPHILS NFR BLD: 76.5 % (ref 44–72)
NRBC # BLD AUTO: 0 K/UL
NRBC BLD-RTO: 0 /100 WBC
OSMOLALITY UR: 168 MOSM/KG H2O (ref 300–900)
PLATELET # BLD AUTO: 221 K/UL (ref 164–446)
PMV BLD AUTO: 9.2 FL (ref 9–12.9)
POTASSIUM SERPL-SCNC: 4 MMOL/L (ref 3.6–5.5)
RBC # BLD AUTO: 3.4 M/UL (ref 4.7–6.1)
SODIUM SERPL-SCNC: 120 MMOL/L (ref 135–145)
SODIUM UR-SCNC: 20 MMOL/L
WBC # BLD AUTO: 5.6 K/UL (ref 4.8–10.8)

## 2021-09-13 PROCEDURE — 85025 COMPLETE CBC W/AUTO DIFF WBC: CPT

## 2021-09-13 PROCEDURE — 700102 HCHG RX REV CODE 250 W/ 637 OVERRIDE(OP): Performed by: STUDENT IN AN ORGANIZED HEALTH CARE EDUCATION/TRAINING PROGRAM

## 2021-09-13 PROCEDURE — 96376 TX/PRO/DX INJ SAME DRUG ADON: CPT

## 2021-09-13 PROCEDURE — 93010 ELECTROCARDIOGRAM REPORT: CPT | Performed by: INTERNAL MEDICINE

## 2021-09-13 PROCEDURE — A9270 NON-COVERED ITEM OR SERVICE: HCPCS | Performed by: STUDENT IN AN ORGANIZED HEALTH CARE EDUCATION/TRAINING PROGRAM

## 2021-09-13 PROCEDURE — 700111 HCHG RX REV CODE 636 W/ 250 OVERRIDE (IP): Performed by: STUDENT IN AN ORGANIZED HEALTH CARE EDUCATION/TRAINING PROGRAM

## 2021-09-13 PROCEDURE — 83935 ASSAY OF URINE OSMOLALITY: CPT

## 2021-09-13 PROCEDURE — 80048 BASIC METABOLIC PNL TOTAL CA: CPT

## 2021-09-13 PROCEDURE — A9270 NON-COVERED ITEM OR SERVICE: HCPCS | Performed by: HEALTH CARE PROVIDER

## 2021-09-13 PROCEDURE — 93308 TTE F-UP OR LMTD: CPT

## 2021-09-13 PROCEDURE — 93005 ELECTROCARDIOGRAM TRACING: CPT | Performed by: PHYSICIAN ASSISTANT

## 2021-09-13 PROCEDURE — 700102 HCHG RX REV CODE 250 W/ 637 OVERRIDE(OP): Performed by: HEALTH CARE PROVIDER

## 2021-09-13 PROCEDURE — 83735 ASSAY OF MAGNESIUM: CPT

## 2021-09-13 PROCEDURE — 82570 ASSAY OF URINE CREATININE: CPT

## 2021-09-13 PROCEDURE — 700111 HCHG RX REV CODE 636 W/ 250 OVERRIDE (IP): Performed by: HEALTH CARE PROVIDER

## 2021-09-13 PROCEDURE — 84300 ASSAY OF URINE SODIUM: CPT

## 2021-09-13 PROCEDURE — 96366 THER/PROPH/DIAG IV INF ADDON: CPT

## 2021-09-13 PROCEDURE — 770020 HCHG ROOM/CARE - TELE (206)

## 2021-09-13 PROCEDURE — 96372 THER/PROPH/DIAG INJ SC/IM: CPT

## 2021-09-13 PROCEDURE — 36415 COLL VENOUS BLD VENIPUNCTURE: CPT

## 2021-09-13 PROCEDURE — 99232 SBSQ HOSP IP/OBS MODERATE 35: CPT | Performed by: INTERNAL MEDICINE

## 2021-09-13 PROCEDURE — 93308 TTE F-UP OR LMTD: CPT | Mod: 26 | Performed by: INTERNAL MEDICINE

## 2021-09-13 RX ORDER — OXYCODONE HYDROCHLORIDE 5 MG/1
2.5 TABLET ORAL EVERY 4 HOURS PRN
Status: DISCONTINUED | OUTPATIENT
Start: 2021-09-13 | End: 2021-09-16 | Stop reason: HOSPADM

## 2021-09-13 RX ORDER — SODIUM CHLORIDE 1 G/1
1 TABLET ORAL
Status: DISCONTINUED | OUTPATIENT
Start: 2021-09-13 | End: 2021-09-14

## 2021-09-13 RX ORDER — MAGNESIUM SULFATE 1 G/100ML
1 INJECTION INTRAVENOUS ONCE
Status: COMPLETED | OUTPATIENT
Start: 2021-09-13 | End: 2021-09-13

## 2021-09-13 RX ADMIN — LOSARTAN POTASSIUM 50 MG: 50 TABLET, FILM COATED ORAL at 05:12

## 2021-09-13 RX ADMIN — LEVOTHYROXINE SODIUM 112 MCG: 0.11 TABLET ORAL at 05:11

## 2021-09-13 RX ADMIN — POLYETHYLENE GLYCOL 3350 1 PACKET: 17 POWDER, FOR SOLUTION ORAL at 11:59

## 2021-09-13 RX ADMIN — FINASTERIDE 5 MG: 5 TABLET, FILM COATED ORAL at 05:12

## 2021-09-13 RX ADMIN — HEPARIN SODIUM 5000 UNITS: 5000 INJECTION, SOLUTION INTRAVENOUS; SUBCUTANEOUS at 05:10

## 2021-09-13 RX ADMIN — OMEPRAZOLE 20 MG: 20 CAPSULE, DELAYED RELEASE ORAL at 21:48

## 2021-09-13 RX ADMIN — HEPARIN SODIUM 5000 UNITS: 5000 INJECTION, SOLUTION INTRAVENOUS; SUBCUTANEOUS at 14:03

## 2021-09-13 RX ADMIN — HEPARIN SODIUM 5000 UNITS: 5000 INJECTION, SOLUTION INTRAVENOUS; SUBCUTANEOUS at 21:49

## 2021-09-13 RX ADMIN — Medication 1 G: at 14:03

## 2021-09-13 RX ADMIN — DOCUSATE SODIUM 50 MG AND SENNOSIDES 8.6 MG 2 TABLET: 8.6; 5 TABLET, FILM COATED ORAL at 18:47

## 2021-09-13 RX ADMIN — DOCUSATE SODIUM 50 MG AND SENNOSIDES 8.6 MG 2 TABLET: 8.6; 5 TABLET, FILM COATED ORAL at 05:10

## 2021-09-13 RX ADMIN — SERTRALINE HYDROCHLORIDE 25 MG: 50 TABLET ORAL at 05:12

## 2021-09-13 RX ADMIN — Medication 1 G: at 18:47

## 2021-09-13 RX ADMIN — ASPIRIN 81 MG: 81 TABLET, CHEWABLE ORAL at 05:11

## 2021-09-13 RX ADMIN — MAGNESIUM SULFATE 1 G: 1 INJECTION INTRAVENOUS at 08:34

## 2021-09-13 RX ADMIN — CLONIDINE HYDROCHLORIDE 0.1 MG: 0.1 TABLET ORAL at 18:47

## 2021-09-13 RX ADMIN — ENALAPRILAT 1.25 MG: 1.25 INJECTION INTRAVENOUS at 11:59

## 2021-09-13 RX ADMIN — CLONIDINE HYDROCHLORIDE 0.1 MG: 0.1 TABLET ORAL at 05:11

## 2021-09-13 RX ADMIN — TAMSULOSIN HYDROCHLORIDE 0.4 MG: 0.4 CAPSULE ORAL at 08:34

## 2021-09-13 ASSESSMENT — FIBROSIS 4 INDEX: FIB4 SCORE: 2.01

## 2021-09-13 NOTE — PROGRESS NOTES
[facility] Cardiology Follow-up Note    Date of Service:    9/13/2021      Name:   Jerel Landrum   YOB: 1931  Age:   89 y.o.  male   MRN:   [id number]      Chief Complaint: PAF, bradycardia, covid    HPI:  Mr Landrum is a 89 y/o fellow with PMH including PAF (historically on amio, no OAC), Essential hypertension, hypothyroidism who presented to outside hospital with dizziness, found to be bradycardic and COVID + (fully vaxed).    Interim Events:  He is feeling ok today  Has not been out of bed, but was up to commode yesterday, had some dizziness.   He states he wants us to get him better so he can go home and see his cardiologist, Dr. Delarosa in [place], [place].  He does not want us to put a pacemaker for him here.   He does not want to come back here.   Does not have family or support here.    Denies shortness of breath or cough.  No fever, aches ,chills.    + for dizziness with movement, denies dizziness at rest.    Also of note:  States has been dizzy, lightheaded on and off for about 6 months.   He recently had a heart monitor placed by Dr. Delarosa, sent it back about 2 weeks ago  Has an appt on Thursday to go over the results.   He states he has had multiple changes to his antihypertensives recently (sounds like he was on losartan, changes to amlod, then back to losartan due to possible constipation side effect)  Had a mechanical fall over the summer    ROS  Constitutional:  denies fatigue. + dizziness  Respiratory:  Denies shortness of breath, no cough.  Cardiovascular:  No chest pain.  no lower extremity edema.  Denies orthopnea or PND.  : denies polyuria, no dysuria.  GI:  Denies nausea/vomiting.  No abdominal distention.  Neuro:  Denies dizziness, syncope.  Hem/lymph: Denies easy bleeding/bruising.      All other review of systems reviewed and negative.    Past medical, surgical, social, and family history reviewed and unchanged from admission except as noted in  HPI.    Medications: Reviewed in MAR  Current Facility-Administered Medications   Medication Dose Frequency Provider Last Rate Last Admin   • oxyCODONE immediate-release (ROXICODONE) tablet 2.5 mg  2.5 mg Q4HRS PRN Tripp Forman M.D.       • magnesium sulfate in D5W IVPB premix 1 g  1 g Once Shayne Mccallum M.D.       • senna-docusate (PERICOLACE or SENOKOT S) 8.6-50 MG per tablet 2 Tablet  2 Tablet BID LINO Lobato.O.   2 Tablet at 09/13/21 0510    And   • polyethylene glycol/lytes (MIRALAX) PACKET 1 Packet  1 Packet QDAY PRN LINO Lobato.O.        And   • magnesium hydroxide (MILK OF MAGNESIA) suspension 30 mL  30 mL QDAY PRN LINO Lobato.O.        And   • bisacodyl (DULCOLAX) suppository 10 mg  10 mg QDAY PRN LINO Lobato.O.       • heparin injection 5,000 Units  5,000 Units Q8HRS LINO Lobato.O.   5,000 Units at 09/13/21 0510   • acetaminophen (Tylenol) tablet 650 mg  650 mg Q6HRS PRN LINO Lobato.O.       • Pharmacy Consult Request ...Pain Management Review 1 Each  1 Each PHARMACY TO DOSE LINO Lobato.TIMA.       • enalaprilat (Vasotec) injection 1.25 mg 1 mL  1.25 mg Q6HRS PRN LINO Lobato.O.   1.25 mg at 09/12/21 0125   • labetalol (NORMODYNE/TRANDATE) injection 10 mg  10 mg Q4HRS PRN LINO Lobato.O.       • ondansetron (ZOFRAN) syringe/vial injection 4 mg  4 mg Q4HRS PRN LINO Lobato.O.       • ondansetron (ZOFRAN ODT) dispertab 4 mg  4 mg Q4HRS PRN LINO Lobato.O.       • aspirin (ASA) chewable tab 81 mg  81 mg DAILY JATIN LobatoO.   81 mg at 09/13/21 0511   • finasteride (PROSCAR) tablet 5 mg  5 mg DAILY LINO Lobato.OIdris   5 mg at 09/13/21 0512   • omeprazole (PRILOSEC) capsule 20 mg  20 mg DAILY LINO Lobato.O.   20 mg at 09/12/21 2143   • tamsulosin (FLOMAX) capsule 0.4 mg  0.4 mg AFTER BREAKFAST Miguel Alberts D.O.   0.4 mg at 09/12/21 0916   • sertraline (Zoloft) tablet 25 mg  25 mg  "DAILY Miguel Alberts D.O.   25 mg at 09/13/21 0512   • cloNIDine (CATAPRES) tablet 0.1 mg  0.1 mg TWICE DAILY Miguel Alberts D.O.   0.1 mg at 09/13/21 0511   • levothyroxine (SYNTHROID) tablet 112 mcg  112 mcg AM ES Miguel Alberts D.O.   112 mcg at 09/13/21 0511   • losartan (COZAAR) tablet 50 mg  50 mg Q DAY Miguel Alberts, D.O.   50 mg at 09/13/21 0512   This patient's medications have not been reviewed.    No Known Allergies    Physical Exam  Body mass index is 23.03 kg/m². BP (!) 163/72   Pulse (!) 58   Temp 36.5 °C (97.7 °F) (Oral)   Resp 16   Ht 1.727 m (5' 8\")   Wt 68.7 kg (151 lb 7.3 oz)   SpO2 95%    Vitals:    09/13/21 0005 09/13/21 0413 09/13/21 0511 09/13/21 0728   BP: 150/69 154/71 (!) 163/71 (!) 163/72   Pulse: (!) 45 (!) 48  (!) 58   Resp: 16 16  16   Temp: 36.6 °C (97.8 °F) 36.4 °C (97.6 °F)  36.5 °C (97.7 °F)   TempSrc: Oral Oral  Oral   SpO2: 94% 96%  95%   Weight:       Height:        Oxygen Therapy:  Pulse Oximetry: 95 %, O2 (LPM): 0, O2 Delivery Device: None - Room Air    General: no apparent distress  Neck: no JVD   Lungs: normal effort,  without crackles, no wheezing or rhonchi  Heart: ash rate, regular rhythm, no murmur, no rub  EXT: trace lower extremity edema  Abdomen: soft, non tender, non distended  Neurological: No focal deficits, no facial asymmetry.  Normal speech  Psychiatric: Appropriate affect, alert and oriented x 3  Skin: Warm extremities, no rash    Labs (personally reviewed):     Lab Results   Component Value Date/Time    SODIUM 120 (L) 09/13/2021 05:45 AM    POTASSIUM 4.0 09/13/2021 05:45 AM    CHLORIDE 89 (L) 09/13/2021 05:45 AM    CO2 26 09/13/2021 05:45 AM    GLUCOSE 88 09/13/2021 05:45 AM    BUN 14 09/13/2021 05:45 AM    CREATININE 0.77 09/13/2021 05:45 AM     Lab Results   Component Value Date/Time    ALKPHOSPHAT 68 09/12/2021 03:48 AM    ASTSGOT 36 09/12/2021 03:48 AM    ALTSGPT 53 (H) 09/12/2021 03:48 AM    TBILIRUBIN 0.5 09/12/2021 03:48 AM    "     Cardiac Imaging and Procedures Review:      Personal Telemetry Review: sinus bradycardia with HR in the 40-60s,       Assessment and Medical Decision Makin   Sinus bradycardia  -    Patient states he recently had heart monitor, through [facility]  -    I attempted to phone Dr. Delarosa this morning, but his office does not open until 0930  -    Will attempt to phone again and get records   -    Echo pending.    2   Covid positive  -    Vaccinated and currently asymptomatic.     3   Essential hypertension   -    Poorly controlled at this time.   -    Would recommend advancing losartan    4   Hypothyroidism.  -    TSH wnl     5   Hyponatremia (euvolemic)  -    Na 120 this morning  -    Management per primary team.    Will continue to follow and have discussion with Dr. Little and try to reach Dr. Delarosa.    Christie Kuo PA-C  [facility]

## 2021-09-13 NOTE — PROGRESS NOTES
[facility] [place] FAMILY MEDICINE PROGRESS NOTE     Attending: Manasa Rooney M.D.  Senior Resident: Tripp Forman M.D. (PGY-3)  Margarito Resident: Shayne Mccallum M.D. (PGY-1)  PATIENT: Jerel Landrum; [id number]; 9/6/1931     ID: 89 y.o. COVID + male with past medical history of atrial fibrillation, hypertension, hyperthroidism, BPH was admitted on 09/11/201 following transfer from [facility] for several month history of dizziness and recently discovered sinus bradycardia     24 Hour Events: Cardiology evaluated patient and provided recommendations (see Cardiology Note). No acute events overnight. He has remained in sinus bradycardia with intermittent lightheadedness.      Subjective: Pt denies new concerns at this time. No chest pain, SOB, fever, chills, or abdominal pain. He continues to experience intermittent lightheadedness/dizziness but this has been stable for past 6 mo.       OBJECTIVE:  Temp:  [35.8 °C (96.5 °F)-36.6 °C (97.8 °F)] 36.5 °C (97.7 °F)  Pulse:  [45-58] 58  Resp:  [16-18] 16  BP: (143-163)/(67-72) 163/72  SpO2:  [94 %-96 %] 95 %    Intake/Output Summary (Last 24 hours) at 9/13/2021 0743  Last data filed at 9/13/2021 0330  Gross per 24 hour   Intake 750 ml   Output 2900 ml   Net -2150 ml       PE:  Gen: No acute distress, resting comfortably in bed  HEENT: normocephalic, atraumatic, EOMI  Pulm: clear to auscultation bilaterally, no respiratory distress   Cardio: bradycardia, no M/R/G  Abdom: soft, nontender, nondistended, normoactive bowel sounds in all quadrants  Ext: No edema, 2+ pulses bilaterally  Neuro: Grossly intact    LABS:  Recent Labs     09/12/21  0348   WBC 5.7   RBC 3.47*   HEMOGLOBIN 11.4*   HEMATOCRIT 33.6*   MCV 96.8   MCH 32.9   RDW 46.6   PLATELETCT 215   MPV 8.7*     Recent Labs     09/12/21  0348 09/13/21  0545   SODIUM 123* 120*   POTASSIUM 4.0 4.0   CHLORIDE 89* 89*   CO2 22 26   BUN 11 14   CREATININE 0.62 0.77   CALCIUM 8.5 8.4*   MAGNESIUM 1.8 1.9   ALBUMIN 3.2  --      Estimated GFR/CRCL =  Estimated Creatinine Clearance: 61.7 mL/min (by C-G formula based on SCr of 0.77 mg/dL).  Recent Labs     09/12/21  0348 09/13/21  0545   GLUCOSE 88 88     Recent Labs     09/12/21 0348   ASTSGOT 36   ALTSGPT 53*   TBILIRUBIN 0.5   ALKPHOSPHAT 68   GLOBULIN 2.7             No results for input(s): INR, APTT, FIBRINOGEN in the last 72 hours.    Invalid input(s): DIMER    MICROBIOLOGY:   No results found for: BLOODCULTU, BLDCULT, BCHOLD     IMAGING:   EC-ECHOCARDIOGRAM COMPLETE W/O CONT    (Results Pending)       MEDS:  Current Facility-Administered Medications   Medication Last Admin   • oxyCODONE immediate-release (ROXICODONE) tablet 2.5 mg     • senna-docusate (PERICOLACE or SENOKOT S) 8.6-50 MG per tablet 2 Tablet 2 Tablet at 09/13/21 0510    And   • polyethylene glycol/lytes (MIRALAX) PACKET 1 Packet      And   • magnesium hydroxide (MILK OF MAGNESIA) suspension 30 mL      And   • bisacodyl (DULCOLAX) suppository 10 mg     • heparin injection 5,000 Units 5,000 Units at 09/13/21 0510   • acetaminophen (Tylenol) tablet 650 mg     • Pharmacy Consult Request ...Pain Management Review 1 Each     • enalaprilat (Vasotec) injection 1.25 mg 1 mL 1.25 mg at 09/12/21 0125   • labetalol (NORMODYNE/TRANDATE) injection 10 mg     • ondansetron (ZOFRAN) syringe/vial injection 4 mg     • ondansetron (ZOFRAN ODT) dispertab 4 mg     • aspirin (ASA) chewable tab 81 mg 81 mg at 09/13/21 0511   • finasteride (PROSCAR) tablet 5 mg 5 mg at 09/13/21 0512   • omeprazole (PRILOSEC) capsule 20 mg 20 mg at 09/12/21 2143   • tamsulosin (FLOMAX) capsule 0.4 mg 0.4 mg at 09/12/21 0916   • sertraline (Zoloft) tablet 25 mg 25 mg at 09/13/21 0512   • cloNIDine (CATAPRES) tablet 0.1 mg 0.1 mg at 09/13/21 0511   • levothyroxine (SYNTHROID) tablet 112 mcg 112 mcg at 09/13/21 0511   • losartan (COZAAR) tablet 50 mg 50 mg at 09/13/21 0512       PROBLEM LIST:  No problems updated.    ASSESSMENT/PLAN: 89 y.o. male with past medical history of atrial  fibrillation, hypertension, hyperthroidism, BPH was admitted for symptomatic bradycardia     #Bradycardia, symptomatic  Unknown duration but patient states that symptoms have been stable for > 6 months. He states that he recently finished a 2 week ambulatory event monitor recording with his home Cardiologist. No known history of CAD or ischemic events in recent past.  - Cardiology consulted and will continue to provide treatment recs  - Hold amiodarone due to bradycardia  - TTE ordered for evaluation of structural cardiac abnormalities  - Monitor on telemetry     #Atrial fibrillation, chronic, rate controlled  Long standing history of atrial fibrillation and use of amiodarone for rate/rhythm control. Not currently on anticoagulation.  - Hold amiodarone per Cardiology  - Monitor on telemetry     #COVID + status  Tested positive prior to admission but remains asymptomatic. Received the vaccine. No respiratory support required at this time.  - CTM for respiratory symptoms  - Supplemental oxygen as needed  - Airborne precautions     #Hyponatremia  Moderate hyponatremia with Na 120 which appears to be hypotonic hyponatremia. Unknown duration.  - Fluid restriction  - Urine studies ordered, may update plan once resulted  - Repeat BMP on 09/14     #BPH  - Continue home finasteride, tamsulosin     #Hypothyroidism  TSH level on admission WNL.   - Continue home levothyroxine 112mcg daily     #HTN  BP elevated on admission and chronically per patient.  - Continue home clonidine 0.1mg BID and losartan 50mg daily  - PRN BP meds ordered as well     #Normocytic anemia, mild  Unknown duration and etiology.  - Monitor with regular CBC, may consider anemia workup once medically stable     #Prophylaxis  - Heparin     #DISPOSITION  - Continued inpatient care for cardiac workup, possible discharge once ECHO completed           Shayne Mccallum M.D.   PGY-1  [facility]

## 2021-09-13 NOTE — DISCHARGE SUMMARY
"  [facility] FAMILY MEDICINE DISCHARGE SUMMARY     Admit Date:  9/11/2021       Discharge Date:   9/16/2021    Service:   [facility] Family Medicine Inpatient Team  Attending Physician(s):   Manasa Rooney M.D. / Vira Calhoun M.D.  Senior Resident(s):   Tripp Forman M.D.  Margarito Resident(s):   Shayne Mccallum M.D.    Primary Diagnosis:   Iatrogenic Sinus Bradycardia    Secondary Diagnoses:                Hyponatremia  SIADH  Orthostatic hypotension  Paroxysmal atrial fibrillation  Hypertension  COVID-19 Infection  Benign Prostatic Hypertrophy  Hypothyroidism  Normocytic anemia   Hepatic cyst    HPI:     Per Dr. Alberts's H&P dated 9/11/21:    \"Jerel Landrum is a 89 y.o. male who presented 9/11/2021 from outside facility [facility] with complaints of dizziness.  He was noted to be bradycardic at outside facility and sinus bradycardia per EKG.  He has had telemetry monitoring for the past 2 days and no episodes of heart block and not on any medication that cause heart block.  Patient is on amiodarone for atrial fibrillation and does not take any anticoagulation outside of daily aspirin.  He states he is fully vaccinated with COVID-19 Pfizer vaccine however was noted to have Covid at outside facility without any symptoms.  Of note, patient states that he does have some mild bilateral lower extremity edema however no orthopnea, paroxysmal nocturnal dyspnea, shortness of breath on exertion or chest pain.  He otherwise denies the following ROS: Pleuritic/substernal chest pain, diaphoresis, anosmia, ageusia, hemoptysis, cough with sputum production, nausea, vomiting, fever, chills, constipation, diarrhea, melena, hematochezia, dysuria, hematuria, syncope, visual changes, loss of consciousness, paresthesias.     Vital signs at time presentation were as follows: 97.8, 55, 15, 176/73, 94% room air.     Outside facility labs were obtained and CMP reveals hyponatremia of 122, hypochloremia of 92, albumin 3.2 mild " "elevation in ALT at 54 and otherwise unremarkable.  CBC was performed and reveals mild normocytic anemia with hemoglobin 9.9, hematocrit 29.0 with MCV of 97 and otherwise relatively unremarkable.  PT/INR within normal limits as well as APTT.  Urinalysis unremarkable outside facility.  Urine drug screen unremarkable as well.  Chest x-ray reveals right basilar atelectasis and likely early pneumonia.  Twelve-lead EKG revealed sinus bradycardia with QTC of 424 and without ischemic changes.  Good R wave progression in precordial leads and no ST segment elevations or depressions or heart blocks appreciated.     Patient in agreement with observation admission for symptomatic sinus bradycardia.  He agrees to DO NOT RESUSCITATE DO NOT INTUBATE CODE STATUS at time of evaluation and alert and oriented x4.  He will be optimized in cardiology telemetry unit and continuation of optimization on medical gutierrez floor.\"       Hospital Summary:       Jerel Landrum was admitted to [facility] on 9/11/21 as a transfer from [facility] for evaluation and care of symptomatic bradycardia. Cardiology evaluated patient and recommended discontinuation of amiodarone due to possible iatrogenic-induction of bradycardia with recommendation for outpatient 30 day ambulatory event monitor. Echocardiogram was negative for structural or functional abnormalities, but did note incidental large hepatic cyst. Patient was found to be COVID-19 positive but reported history of vaccination and remained asymptomatic during stay. Patient experienced asymptomatic moderate hyponatremia which was resistant despite treatment with fluid restriction and salt tablets. Patient's home clinic was contacted who advised a baseline of hyponatremia. Patient's hyponatremia was evaluated and determined to be SIADH from probable combination of age-related change, sertraline use, thyroid disease, and COVID-19 contributions. Patient's home medications for BPH and hypothyroidism were " continued to floor. Clonidine was used for HTN in addition to losartan, though clonidine was eventually discontinued due to concerns for causing worsening of bradycardia. Patient was found to have significant orthostatic hypotension and was advised how to care for this condition at home.    Jerel was determined to be safe for discharge to home on 9/16/21. At the time of discharge, patient was prescribed a daily aspirin and salt tablets, with refills of his home levothyroxine and losartan. Patient was instructed to follow up with his primary care physician in 1 week for management of hyponatremia, check of heart rate/blood pressure, referral to physical therapy, and evaluation of liver cyst. Patient instructed to follow up with Cardiology in 2 weeks for management of bradycardia and atrial fibrillation. Strict return precautions were given and opportunity for all question was provided. Patient and family verbalized understanding and agreement with plan of care at time of discharge.     Consultants:      Cardiology    Procedures:        None    Imaging/ Testing:      EC-ECHOCARDIOGRAM LTD W/O CONT   Final Result        Echocardiography Laboratory     CONCLUSIONS  No prior study is available for comparison.   Normal left ventricular systolic function.   Left ventricular ejection fraction is visually estimated to be 65%.  No significant valve abnormalities.   Incidentally, large 8.9 x 7.1 cm hepatic cyst noted.    Discharge Medications:           Medication List      START taking these medications      Instructions   aspirin 81 MG Chew chewable tablet  Commonly known as: ASA   Chew 1 Tablet every day.  Dose: 81 mg     levothyroxine 112 MCG Tabs  Commonly known as: SYNTHROID   Take 1 Tablet by mouth every morning on an empty stomach.  Dose: 112 mcg     losartan 25 MG Tabs  Commonly known as: COZAAR   Take 3 Tablets by mouth every day.  Dose: 75 mg     sodium chloride 1 GM Tabs  Commonly known as: SALT   Take 2 Tablets by  mouth 3 times a day with meals.  Dose: 2 g        CONTINUE taking these medications      Instructions   amLODIPine 5 MG Tabs  Commonly known as: NORVASC   Take 5 mg by mouth every day.  Dose: 5 mg     Lubiprostone 8 MCG Caps   Take 8 mcg by mouth 2 times a day.  Dose: 8 mcg     polyethylene glycol/lytes 17 g Pack  Commonly known as: MIRALAX   Take 17 g by mouth 2 times a day.  Dose: 17 g     Zoloft 25 MG tablet  Generic drug: sertraline   Take 25 mg by mouth every morning.  Dose: 25 mg        STOP taking these medications    amiodarone 200 MG Tabs  Commonly known as: Cordarone     citalopram 10 MG tablet  Commonly known as: CeleXA          Disposition:     Discharge to home     Diet:     Diet rich in fruits and vegetables, low in added salt    Activity:    Slowly return to pre-hospitalization activities    Instructions:      Primary Care Provider- Please recheck sodium levels, blood pressure, and heart rate for patient. Review medical necessity of sertraline as this can be associated with SIADH. Please refer patient to outpatient physical therapy. Please review recent imaging of liver cyst and discuss whether to pursue further treatment.  Cardiology- Please review management of atrial fibrillation and bradycardia with patient.     For full list of patient instructions, please see hospital summary above.     Please CC:  Chelsey Hancock NP    Follow up appointment details :      Patient instructed to follow up with his primary care physician in 1 week for management of hyponatremia, check of heart rate/blood pressure, referral to physical therapy, and evaluation of liver cyst.   Patient instructed to follow up with Cardiology in 2 weeks for management of bradycardia and atrial fibrillation.     Pending Studies:        None

## 2021-09-13 NOTE — PROGRESS NOTES
Brief Cardiology Update:  Spoke with  at Dr. Abreu's office.   He is out of the office today, will be back tomorrow.  I am requesting they fax us the results of his recent Zio Patch and echo.    Christie Kuo PA-C  9/13/2021

## 2021-09-13 NOTE — PROGRESS NOTES
Received bedside report from VENANCIO Cardoza, pt care assumed. No signs of acute distress noted at this time. Bed in lowest position. Pt educated on fall risk and use of call light.    Covid-19 surge in effect.

## 2021-09-14 LAB
ANION GAP SERPL CALC-SCNC: 7 MMOL/L (ref 7–16)
ANION GAP SERPL CALC-SCNC: 8 MMOL/L (ref 7–16)
BUN SERPL-MCNC: 14 MG/DL (ref 8–22)
BUN SERPL-MCNC: 17 MG/DL (ref 8–22)
CALCIUM SERPL-MCNC: 8 MG/DL (ref 8.5–10.5)
CALCIUM SERPL-MCNC: 8.2 MG/DL (ref 8.5–10.5)
CHLORIDE SERPL-SCNC: 88 MMOL/L (ref 96–112)
CHLORIDE SERPL-SCNC: 90 MMOL/L (ref 96–112)
CO2 SERPL-SCNC: 23 MMOL/L (ref 20–33)
CO2 SERPL-SCNC: 23 MMOL/L (ref 20–33)
CREAT SERPL-MCNC: 0.75 MG/DL (ref 0.5–1.4)
CREAT SERPL-MCNC: 0.76 MG/DL (ref 0.5–1.4)
GLUCOSE SERPL-MCNC: 86 MG/DL (ref 65–99)
GLUCOSE SERPL-MCNC: 87 MG/DL (ref 65–99)
OSMOLALITY SERPL: 251 MOSM/KG H2O (ref 278–298)
POTASSIUM SERPL-SCNC: 4.3 MMOL/L (ref 3.6–5.5)
POTASSIUM SERPL-SCNC: 4.5 MMOL/L (ref 3.6–5.5)
SODIUM SERPL-SCNC: 119 MMOL/L (ref 135–145)
SODIUM SERPL-SCNC: 120 MMOL/L (ref 135–145)

## 2021-09-14 PROCEDURE — 700102 HCHG RX REV CODE 250 W/ 637 OVERRIDE(OP): Performed by: STUDENT IN AN ORGANIZED HEALTH CARE EDUCATION/TRAINING PROGRAM

## 2021-09-14 PROCEDURE — 36415 COLL VENOUS BLD VENIPUNCTURE: CPT

## 2021-09-14 PROCEDURE — 700111 HCHG RX REV CODE 636 W/ 250 OVERRIDE (IP): Performed by: STUDENT IN AN ORGANIZED HEALTH CARE EDUCATION/TRAINING PROGRAM

## 2021-09-14 PROCEDURE — 770020 HCHG ROOM/CARE - TELE (206)

## 2021-09-14 PROCEDURE — A9270 NON-COVERED ITEM OR SERVICE: HCPCS | Performed by: HEALTH CARE PROVIDER

## 2021-09-14 PROCEDURE — 700102 HCHG RX REV CODE 250 W/ 637 OVERRIDE(OP): Performed by: HEALTH CARE PROVIDER

## 2021-09-14 PROCEDURE — 83930 ASSAY OF BLOOD OSMOLALITY: CPT

## 2021-09-14 PROCEDURE — A9270 NON-COVERED ITEM OR SERVICE: HCPCS | Performed by: STUDENT IN AN ORGANIZED HEALTH CARE EDUCATION/TRAINING PROGRAM

## 2021-09-14 PROCEDURE — 700105 HCHG RX REV CODE 258: Performed by: HEALTH CARE PROVIDER

## 2021-09-14 PROCEDURE — 80048 BASIC METABOLIC PNL TOTAL CA: CPT

## 2021-09-14 RX ORDER — AMLODIPINE BESYLATE 5 MG/1
5 TABLET ORAL DAILY
COMMUNITY

## 2021-09-14 RX ORDER — SODIUM CHLORIDE 1 G/1
1 TABLET ORAL
Status: DISCONTINUED | OUTPATIENT
Start: 2021-09-14 | End: 2021-09-14

## 2021-09-14 RX ORDER — POLYETHYLENE GLYCOL 3350 17 G/17G
17 POWDER, FOR SOLUTION ORAL 2 TIMES DAILY
COMMUNITY

## 2021-09-14 RX ORDER — SERTRALINE HYDROCHLORIDE 25 MG/1
25 TABLET, FILM COATED ORAL EVERY MORNING
COMMUNITY

## 2021-09-14 RX ORDER — SODIUM CHLORIDE 9 MG/ML
1000 INJECTION, SOLUTION INTRAVENOUS ONCE
Status: COMPLETED | OUTPATIENT
Start: 2021-09-14 | End: 2021-09-14

## 2021-09-14 RX ORDER — SODIUM CHLORIDE 1 G/1
2 TABLET ORAL
Status: DISCONTINUED | OUTPATIENT
Start: 2021-09-14 | End: 2021-09-16 | Stop reason: HOSPADM

## 2021-09-14 RX ORDER — CITALOPRAM HYDROBROMIDE 10 MG/1
10 TABLET ORAL DAILY
Status: ON HOLD | COMMUNITY
End: 2021-09-16

## 2021-09-14 RX ORDER — LUBIPROSTONE 8 UG/1
8 CAPSULE ORAL 2 TIMES DAILY
COMMUNITY

## 2021-09-14 RX ADMIN — DOCUSATE SODIUM 50 MG AND SENNOSIDES 8.6 MG 2 TABLET: 8.6; 5 TABLET, FILM COATED ORAL at 17:35

## 2021-09-14 RX ADMIN — Medication 2 G: at 17:35

## 2021-09-14 RX ADMIN — OMEPRAZOLE 20 MG: 20 CAPSULE, DELAYED RELEASE ORAL at 21:14

## 2021-09-14 RX ADMIN — SODIUM CHLORIDE 1000 ML: 9 INJECTION, SOLUTION INTRAVENOUS at 07:56

## 2021-09-14 RX ADMIN — LOSARTAN POTASSIUM 75 MG: 50 TABLET, FILM COATED ORAL at 05:27

## 2021-09-14 RX ADMIN — DOCUSATE SODIUM 50 MG AND SENNOSIDES 8.6 MG 2 TABLET: 8.6; 5 TABLET, FILM COATED ORAL at 05:25

## 2021-09-14 RX ADMIN — SERTRALINE HYDROCHLORIDE 25 MG: 50 TABLET ORAL at 05:26

## 2021-09-14 RX ADMIN — FINASTERIDE 5 MG: 5 TABLET, FILM COATED ORAL at 05:25

## 2021-09-14 RX ADMIN — CLONIDINE HYDROCHLORIDE 0.1 MG: 0.1 TABLET ORAL at 05:25

## 2021-09-14 RX ADMIN — POLYETHYLENE GLYCOL 3350 1 PACKET: 17 POWDER, FOR SOLUTION ORAL at 21:14

## 2021-09-14 RX ADMIN — LEVOTHYROXINE SODIUM 112 MCG: 0.11 TABLET ORAL at 05:26

## 2021-09-14 RX ADMIN — CLONIDINE HYDROCHLORIDE 0.1 MG: 0.1 TABLET ORAL at 17:35

## 2021-09-14 RX ADMIN — ASPIRIN 81 MG: 81 TABLET, CHEWABLE ORAL at 05:25

## 2021-09-14 RX ADMIN — HEPARIN SODIUM 5000 UNITS: 5000 INJECTION, SOLUTION INTRAVENOUS; SUBCUTANEOUS at 05:27

## 2021-09-14 RX ADMIN — HEPARIN SODIUM 5000 UNITS: 5000 INJECTION, SOLUTION INTRAVENOUS; SUBCUTANEOUS at 14:43

## 2021-09-14 RX ADMIN — HEPARIN SODIUM 5000 UNITS: 5000 INJECTION, SOLUTION INTRAVENOUS; SUBCUTANEOUS at 21:14

## 2021-09-14 RX ADMIN — TAMSULOSIN HYDROCHLORIDE 0.4 MG: 0.4 CAPSULE ORAL at 07:56

## 2021-09-14 ASSESSMENT — PAIN DESCRIPTION - PAIN TYPE: TYPE: ACUTE PAIN

## 2021-09-14 NOTE — PROGRESS NOTES
Received bedside report from VENANCIO Thibodeaux, pt care assumed. No signs of acute distress noted at this time. Bed in lowest position. Pt educated on fall risk and use of call light.    Covid-19 surge in effect

## 2021-09-14 NOTE — DISCHARGE PLANNING
Anticipated Discharge Disposition: Home when clear    Action: Patient discussed during IDT rounds.  Patient is still hyponatremic, medical team expects that the patient will need 2+ days to have his sodium corrected to at least 130.    Barriers to Discharge: Medical clearance     Plan: Case coordination to continue to follow up with medical team to discuss discharge barriers.

## 2021-09-14 NOTE — PROGRESS NOTES
Pt's HR while supine ranged from 46-50.   Pt's HR while sitting edge of bed ranged from 52-55  Pt's HR while standing ranged from 52-56.

## 2021-09-14 NOTE — PROGRESS NOTES
[facility] Cardiology Follow-up Note    Date of Service:    9/14/2021      Name:   Jerel Landrum   YOB: 1931  Age:   89 y.o.  male   MRN:   [id number]      Chief Complaint: PAF, bradycardia, covid    HPI:  Mr Lucita is a 89 y/o fellow with PMH including PAF (historically on amio, no OAC), Essential hypertension, hypothyroidism who presented to outside hospital with dizziness, found to be bradycardic and COVID + (fully vaxed).    Interim Events:  Patient feeling ok this morning  No new complaints.   He states he really only feels dizzy when he stands up.  His BP was low sitting up yesterday, perhaps he's having some orthostatic issues  Also sodium continues to trend down.  This morning he told me he hoped he would need a ppm because he wants to feel better.    I received his Zio Patch 7/9/2021-7/23/2021 from Dr. Abreu's office   Recorded 13 days 23 hours  -  Min HR 39 at 0716 0433  -  Max  during 5 beats of SVT, longest SVT 12.5 sec at   -  Average HR 54 underlying SR/SB  -  10.7% PAC burden, negligible PVCs  -  SR noted in the 100 range 7/12/2021 around 0840    Echo 11/23/2020:  EF 60-65%, no significant valve abnormalities.  No effusion    EKG 7/22/2021 from Dr. Abreu:  SR HR 58, Qrsd 104, no significant ST changes, Qtc 436    ROS  Constitutional:  denies fatigue. + dizziness when standing  Respiratory:  Denies shortness of breath, + cough.  Cardiovascular:  No chest pain.  no lower extremity edema.  Denies orthopnea or PND.  : denies polyuria, no dysuria.  GI:  Denies nausea/vomiting.  No abdominal distention.  Neuro:  Denies dizziness, syncope.  Hem/lymph: Denies easy bleeding/bruising.      All other review of systems reviewed and negative.    Past medical, surgical, social, and family history reviewed and unchanged from admission except as noted in HPI.    Medications: Reviewed in MAR  Current Facility-Administered Medications   Medication Dose Frequency Provider Last Rate  Last Admin   • [Held by provider] sodium chloride (SALT) tablet 2 g  2 g TID WITH MEALS Shayne Mccallum M.D.       • NS (BOLUS) infusion 1,000 mL  1,000 mL Once Shayne Mccallum M.D.   1,000 mL at 09/14/21 0756   • oxyCODONE immediate-release (ROXICODONE) tablet 2.5 mg  2.5 mg Q4HRS PRN Tripp Forman M.D.       • losartan (COZAAR) tablet 75 mg  75 mg Q DAY Shayne Mccallum M.D.   75 mg at 09/14/21 0527   • senna-docusate (PERICOLACE or SENOKOT S) 8.6-50 MG per tablet 2 Tablet  2 Tablet BID LINO Lobato.O.   2 Tablet at 09/14/21 0525    And   • polyethylene glycol/lytes (MIRALAX) PACKET 1 Packet  1 Packet QDAY PRN LINO Lobato.O.   1 Packet at 09/13/21 1159    And   • magnesium hydroxide (MILK OF MAGNESIA) suspension 30 mL  30 mL QDAY PRN LINO Lobato.OIdris        And   • bisacodyl (DULCOLAX) suppository 10 mg  10 mg QDAY PRN LINO Lobato.O.       • heparin injection 5,000 Units  5,000 Units Q8HRS LINO Lobato.O.   5,000 Units at 09/14/21 0527   • acetaminophen (Tylenol) tablet 650 mg  650 mg Q6HRS PRN LINO Lobato.O.       • Pharmacy Consult Request ...Pain Management Review 1 Each  1 Each PHARMACY TO DOSE LINO Lobato.TIMA.       • enalaprilat (Vasotec) injection 1.25 mg 1 mL  1.25 mg Q6HRS PRN LINO Lobato.O.   1.25 mg at 09/13/21 1159   • labetalol (NORMODYNE/TRANDATE) injection 10 mg  10 mg Q4HRS PRN LINO Lobato.O.       • ondansetron (ZOFRAN) syringe/vial injection 4 mg  4 mg Q4HRS PRN Miguel Alberts D.O.       • ondansetron (ZOFRAN ODT) dispertab 4 mg  4 mg Q4HRS PRN JATIN LobatoO.       • aspirin (ASA) chewable tab 81 mg  81 mg DAILY LINO Lobato.O.   81 mg at 09/14/21 0525   • finasteride (PROSCAR) tablet 5 mg  5 mg DAILY LINO Lobato.O.   5 mg at 09/14/21 0525   • omeprazole (PRILOSEC) capsule 20 mg  20 mg DAILY Miguel Alberts D.O.   20 mg at 09/13/21 0065   • tamsulosin (FLOMAX) capsule 0.4 mg  0.4 mg  "AFTER BREAKFAST LINO Lobato.O.   0.4 mg at 09/14/21 0756   • sertraline (Zoloft) tablet 25 mg  25 mg DAILY LINO Lobato.O.   25 mg at 09/14/21 0526   • cloNIDine (CATAPRES) tablet 0.1 mg  0.1 mg TWICE DAILY LINO Lobato.O.   0.1 mg at 09/14/21 0525   • levothyroxine (SYNTHROID) tablet 112 mcg  112 mcg AM ES LINO Lobato.O.   112 mcg at 09/14/21 0526   This patient's medications have not been reviewed.    No Known Allergies    Physical Exam  Body mass index is 23.46 kg/m². /71   Pulse (!) 47   Temp 36.1 °C (96.9 °F) (Temporal)   Resp 16   Ht 1.727 m (5' 8\")   Wt 70 kg (154 lb 5.2 oz)   SpO2 94%    Vitals:    09/13/21 2350 09/14/21 0402 09/14/21 0525 09/14/21 0757   BP: 151/69 159/70 159/71    Pulse: (!) 48 (!) 51  (!) 47   Resp: 16 16     Temp: 36.3 °C (97.3 °F) 36.1 °C (96.9 °F)     TempSrc: Temporal Temporal     SpO2: 93% 94%     Weight:       Height:        Oxygen Therapy:  Pulse Oximetry: 94 %, O2 (LPM): 0, O2 Delivery Device: None - Room Air    General: no apparent distress  Neck: no JVD   Lungs: normal effort,  without crackles, no wheezing or rhonchi  Heart: ash rate, regular rhythm, no murmur, no rub  EXT: puffy, trace lower extremity edema  Abdomen: soft, non tender, non distended  Neurological: No focal deficits, no facial asymmetry.  Normal speech  Psychiatric: Appropriate affect, alert and oriented x 3  Skin: Warm extremities, no rash    Labs (personally reviewed):     Lab Results   Component Value Date/Time    SODIUM 119 (LL) 09/14/2021 02:40 AM    POTASSIUM 4.3 09/14/2021 02:40 AM    CHLORIDE 88 (L) 09/14/2021 02:40 AM    CO2 23 09/14/2021 02:40 AM    GLUCOSE 87 09/14/2021 02:40 AM    BUN 17 09/14/2021 02:40 AM    CREATININE 0.76 09/14/2021 02:40 AM     Lab Results   Component Value Date/Time    ALKPHOSPHAT 68 09/12/2021 03:48 AM    ASTSGOT 36 09/12/2021 03:48 AM    ALTSGPT 53 (H) 09/12/2021 03:48 AM    TBILIRUBIN 0.5 09/12/2021 03:48 AM        Cardiac " Imaging and Procedures Review:      Personal Telemetry Review: sinus bradycardia with HR in the 40-60s, avg 50s.      Assessment and Medical Decision Makin   Sinus bradycardia  -    Hemodynamically stable  -    No high grade block.  -    Monitor reviewed, no direct indication for PPM at this time  -    I am suspicious his dizziness is from orthostatic hypotension.    2   Covid positive  -    Vaccinated and currently asymptomatic, slight cough x the past 4 weeks.    3   Essential hypertension   -    Upon chart review, appears he may have significant orthostatic BP, which could certainly be causing his symptoms.  -    BP supine 129/58, BP sitting 91/58  -    This could certainly contribute to his symptoms of dizziness.   -    Getting volume replacement, asked RN to recheck orthostatics afterward.  -    May have to back off again on the losartan    4   Hypothyroidism.  -    TSH wnl     5   Hyponatremia (euvolemic)  -    Na 119 this morning  -    Defer management per primary team    Discussed case with primary team this morning.     Cardiology will sign off at this time.  Please contact our service directly with further questions/concerns.    Will attempt to communicate with Dr. Abreu as well.  I had some trouble getting through to them yesterday.  Patient should follow up closely with him after d/c.      Christie Kuo PA-C  [facility]

## 2021-09-14 NOTE — PROGRESS NOTES
Received bedside report from VENANCIO Ruiz, pt care assumed, VSS, pt assessment complete. Pt AAOx4, c/o no pain at this time. No signs of acute distress noted at this time. POC discussed with pt and verbalizes no questions. Pt denies any additional needs at this time. Bed in lowest position, bed alarm on. Pt educated on fall risk and verbalized understanding, call light within reach, hourly rounding initiated.     COVID-19 surge in effect

## 2021-09-14 NOTE — NON-PROVIDER
[facility] FAMILY MEDICINE PROGRESS NOTE     Attending:   Dr. Toribio MD    Resident:   Dr. Dickson MD (PGY-3)    PATIENT:   Jerel Landrum; [id number]; 9/6/1931    SUBJECTIVE:   No acute events overnight. Patient is resting comfortably in bed. He continues to be bradycardic on tele monitoring. Patient appears to have dry mucous membranes, no signs of lower extremity edema. No new symptoms at this time.    OBJECTIVE:  Vitals:    09/13/21 2150 09/13/21 2350 09/14/21 0402 09/14/21 0525   BP: 148/68 151/69 159/70 159/71   Pulse:  (!) 48 (!) 51    Resp:  16 16    Temp:  36.3 °C (97.3 °F) 36.1 °C (96.9 °F)    TempSrc:  Temporal Temporal    SpO2:  93% 94%    Weight:       Height:           Intake/Output Summary (Last 24 hours) at 9/14/2021 0728  Last data filed at 9/14/2021 0402  Gross per 24 hour   Intake 1520 ml   Output 1550 ml   Net -30 ml       PHYSICAL EXAM:   General: No acute distress, afebrile, resting comfortably, conversational   HEENT: NC/AT. EOMI. Dry mucous membranes  Cardiovascular: RRR without murmurs, rubs, heaves. Normal capillary refill   Respiratory: CTAB, no tachypnea or retractions   Abdomen: normal bowel sounds, soft, nontender, nondistended, no masses, no organomegaly   EXT:  ROSS, no edema  Skin: No erythema/lesions   Neuro: Non-focal, alert and orientated       LABS:  Recent Labs     09/12/21 0348 09/13/21  0545   WBC 5.7 5.6   RBC 3.47* 3.40*   HEMOGLOBIN 11.4* 11.4*   HEMATOCRIT 33.6* 32.3*   MCV 96.8 95.0   MCH 32.9 33.5*   RDW 46.6 44.7   PLATELETCT 215 221   MPV 8.7* 9.2   NEUTSPOLYS  --  76.50*   LYMPHOCYTES  --  9.60*   MONOCYTES  --  10.90   EOSINOPHILS  --  0.50   BASOPHILS  --  0.40     Recent Labs     09/12/21  0348 09/13/21  0545 09/14/21  0240   SODIUM 123* 120* 119*   POTASSIUM 4.0 4.0 4.3   CHLORIDE 89* 89* 88*   CO2 22 26 23   BUN 11 14 17   CREATININE 0.62 0.77 0.76   CALCIUM 8.5 8.4* 8.2*   MAGNESIUM 1.8 1.9  --    ALBUMIN 3.2  --   --      Estimated GFR/CRCL = Estimated Creatinine  Clearance: 62.5 mL/min (by C-G formula based on SCr of 0.76 mg/dL).  Recent Labs     09/12/21  0348 09/13/21  0545 09/14/21  0240   GLUCOSE 88 88 87     Recent Labs     09/12/21 0348   ASTSGOT 36   ALTSGPT 53*   TBILIRUBIN 0.5   ALKPHOSPHAT 68   GLOBULIN 2.7             No results for input(s): INR, APTT, FIBRINOGEN in the last 72 hours.    Invalid input(s): DIMER    MICROBIOLOGY:  No results found for: BLOODCULTU, BLDCULT, BCHOLD     IMAGING:   EC-ECHOCARDIOGRAM LTD W/O CONT   Final Result          CULTURES:   Results     ** No results found for the last 168 hours. **          MEDS:  Current Facility-Administered Medications   Medication Last Admin   • [Held by provider] sodium chloride (SALT) tablet 2 g     • NS (BOLUS) infusion 1,000 mL     • oxyCODONE immediate-release (ROXICODONE) tablet 2.5 mg     • losartan (COZAAR) tablet 75 mg 75 mg at 09/14/21 0527   • senna-docusate (PERICOLACE or SENOKOT S) 8.6-50 MG per tablet 2 Tablet 2 Tablet at 09/14/21 0525    And   • polyethylene glycol/lytes (MIRALAX) PACKET 1 Packet 1 Packet at 09/13/21 1159    And   • magnesium hydroxide (MILK OF MAGNESIA) suspension 30 mL      And   • bisacodyl (DULCOLAX) suppository 10 mg     • heparin injection 5,000 Units 5,000 Units at 09/14/21 0527   • acetaminophen (Tylenol) tablet 650 mg     • Pharmacy Consult Request ...Pain Management Review 1 Each     • enalaprilat (Vasotec) injection 1.25 mg 1 mL 1.25 mg at 09/13/21 1159   • labetalol (NORMODYNE/TRANDATE) injection 10 mg     • ondansetron (ZOFRAN) syringe/vial injection 4 mg     • ondansetron (ZOFRAN ODT) dispertab 4 mg     • aspirin (ASA) chewable tab 81 mg 81 mg at 09/14/21 0525   • finasteride (PROSCAR) tablet 5 mg 5 mg at 09/14/21 0525   • omeprazole (PRILOSEC) capsule 20 mg 20 mg at 09/13/21 2148   • tamsulosin (FLOMAX) capsule 0.4 mg 0.4 mg at 09/13/21 0834   • sertraline (Zoloft) tablet 25 mg 25 mg at 09/14/21 0526   • cloNIDine (CATAPRES) tablet 0.1 mg 0.1 mg at 09/14/21  0525   • levothyroxine (SYNTHROID) tablet 112 mcg 112 mcg at 09/14/21 0526       ASSESSMENT/PLAN: 89 y.o. male with a PMH of Atrial fibrillation, HTN, and hypothyroidism transferred from [place], admitted for dizziness and found to have bradycardia in the 40-50s on 9/11/21.    #Hyponatremia  Patient presented with hyponatremia on admission and his sodium level has ranged from 123-119. Urine chemistry showed urine Na of 20, urine Cr of 28.26, and urine osmolality of 168; FeNa was 0.5% indicating prerenal eitiology. On physical exam, the patient is hypovolemic with dry mucous membranes and his hyponatremia is likely hypovolemic hypotonic hyponatremia. Echo was ordered and did not show any signs of heart failure; his EF is 65%.  - Continue salt tabs, administer 1L NS   - Repeat BMP this afternoon to recheck sodium levels  - Goal sodium of 130 prior to discharge    #Symptomatic Bradycardia  Patient reports having dizziness for >6 months that has been stable. He was taking amiodarone for atrial fibrillation but it has been discontinued by cardiology as it can contribute to bradycardia. Pacemaker will not be placed at this time. He has been in NSR between 40-50bpm during admission.  - Continue tele monitoring  - Hold amiodarone  - Patient is can follow up with outpatient Cardiology    #HTN  - Continue losartan and clonidine    #Afib  - Per cardiology, hold amiodarone as it can exacerbate bradycardia    #BPH  - Continue tamsulosin and finasteride    #Hypothyroidism  - TSH is WNL  - Continue 112 mcg levothyroxine     Disposition: Inpatient management with anticipated discharge home    Karla June, MS3  [facility]

## 2021-09-14 NOTE — PROGRESS NOTES
Goldy from Lab called with critical result of Sodium 119 at 0358. Critical lab result read back to Goldy.   Dr. Tineo notified of critical lab result at 0403.  Critical lab result read back by Dr. Tineo.

## 2021-09-14 NOTE — PROGRESS NOTES
Med Rec complete per pt's pharmacy  Allergies Reviewed  No ABX in the last 14 days    Pt unable to fully participate in interview, last doses unknown

## 2021-09-14 NOTE — PROGRESS NOTES
[facility] [place] [facility]     Attending: Manasa Rooney M.D.  Senior Resident: Tripp Forman M.D. (PGY-3)  Margarito Resident: Shayne Mccallum M.D. (PGY-1)  PATIENT: Jerel Landrum; [id number]; 9/6/1931     ID: 89 y.o. COVID + male with past medical history of atrial fibrillation, hypertension, hyperthroidism, BPH was admitted on 09/11/201 following transfer from [facility] for several month history of dizziness and recently discovered sinus bradycardia     24 Hour Events: ECHO completed and noted normal LV function and EF (65%) as well as incidental large liver cyst (8.9cm x 7.1cm). No acute events overnight. He has remained in sinus bradycardia.      Subjective: Pt denies new concerns at this time. No chest pain, SOB, fever, chills, or abdominal pain. He continues to experience intermittent lightheadedness but this has been stable for past 6 mo.    OBJECTIVE:  Temp:  [36.1 °C (96.9 °F)-36.5 °C (97.7 °F)] 36.1 °C (96.9 °F)  Pulse:  [48-58] 51  Resp:  [16] 16  BP: ()/(58-74) 159/71  SpO2:  [93 %-97 %] 94 %    Intake/Output Summary (Last 24 hours) at 9/14/2021 0608  Last data filed at 9/14/2021 0402  Gross per 24 hour   Intake 1520 ml   Output 2750 ml   Net -1230 ml       PE:  Gen: No acute distress, resting comfortably in bed  HEENT: normocephalic, atraumatic, EOMI  Pulm: clear to auscultation bilaterally, no respiratory distress   Cardio: RRR, no M/R/G  Abdom: soft, nontender, nondistended, normoactive bowel sounds in all quadrants  Ext: No edema, 2+ pulses bilaterally  Neuro: Grossly intact    LABS:  Recent Labs     09/12/21  0348 09/13/21  0545   WBC 5.7 5.6   RBC 3.47* 3.40*   HEMOGLOBIN 11.4* 11.4*   HEMATOCRIT 33.6* 32.3*   MCV 96.8 95.0   MCH 32.9 33.5*   RDW 46.6 44.7   PLATELETCT 215 221   MPV 8.7* 9.2   NEUTSPOLYS  --  76.50*   LYMPHOCYTES  --  9.60*   MONOCYTES  --  10.90   EOSINOPHILS  --  0.50   BASOPHILS  --  0.40     Recent Labs     09/12/21  0348 09/13/21  0545 09/14/21  0240   SODIUM 123* 120* 119*    POTASSIUM 4.0 4.0 4.3   CHLORIDE 89* 89* 88*   CO2 22 26 23   BUN 11 14 17   CREATININE 0.62 0.77 0.76   CALCIUM 8.5 8.4* 8.2*   MAGNESIUM 1.8 1.9  --    ALBUMIN 3.2  --   --      Estimated GFR/CRCL = Estimated Creatinine Clearance: 62.5 mL/min (by C-G formula based on SCr of 0.76 mg/dL).  Recent Labs     09/12/21 0348 09/13/21 0545 09/14/21  0240   GLUCOSE 88 88 87     Recent Labs     09/12/21 0348   ASTSGOT 36   ALTSGPT 53*   TBILIRUBIN 0.5   ALKPHOSPHAT 68   GLOBULIN 2.7             No results for input(s): INR, APTT, FIBRINOGEN in the last 72 hours.    Invalid input(s): DIMER    MICROBIOLOGY:   No results found for: BLOODCULTU, BLDCULT, BCHOLD     IMAGING:   EC-ECHOCARDIOGRAM LTD W/O CONT   Final Result          MEDS:  Current Facility-Administered Medications   Medication Last Admin   • sodium chloride (SALT) tablet 2 g     • oxyCODONE immediate-release (ROXICODONE) tablet 2.5 mg     • losartan (COZAAR) tablet 75 mg 75 mg at 09/14/21 0527   • senna-docusate (PERICOLACE or SENOKOT S) 8.6-50 MG per tablet 2 Tablet 2 Tablet at 09/14/21 0525    And   • polyethylene glycol/lytes (MIRALAX) PACKET 1 Packet 1 Packet at 09/13/21 1159    And   • magnesium hydroxide (MILK OF MAGNESIA) suspension 30 mL      And   • bisacodyl (DULCOLAX) suppository 10 mg     • heparin injection 5,000 Units 5,000 Units at 09/14/21 0527   • acetaminophen (Tylenol) tablet 650 mg     • Pharmacy Consult Request ...Pain Management Review 1 Each     • enalaprilat (Vasotec) injection 1.25 mg 1 mL 1.25 mg at 09/13/21 1159   • labetalol (NORMODYNE/TRANDATE) injection 10 mg     • ondansetron (ZOFRAN) syringe/vial injection 4 mg     • ondansetron (ZOFRAN ODT) dispertab 4 mg     • aspirin (ASA) chewable tab 81 mg 81 mg at 09/14/21 0525   • finasteride (PROSCAR) tablet 5 mg 5 mg at 09/14/21 0525   • omeprazole (PRILOSEC) capsule 20 mg 20 mg at 09/13/21 2148   • tamsulosin (FLOMAX) capsule 0.4 mg 0.4 mg at 09/13/21 0825   • sertraline (Zoloft) tablet  25 mg 25 mg at 09/14/21 0526   • cloNIDine (CATAPRES) tablet 0.1 mg 0.1 mg at 09/14/21 0525   • levothyroxine (SYNTHROID) tablet 112 mcg 112 mcg at 09/14/21 0526       PROBLEM LIST:  No problems updated.    ASSESSMENT/PLAN: 89 y.o. male with past medical history of atrial fibrillation, hypertension, hyperthroidism, BPH was admitted for symptomatic bradycardia     #Bradycardia, symptomatic  Unknown duration but patient states that symptoms have been stable for > 6 months. He states that he recently finished a 2 week ambulatory event monitor recording with his home Cardiologist. No known history of CAD or ischemic events in recent past.  - Cardiology consulted and attempting contact with patient's cardiologist  - Hold amiodarone due to bradycardia  - TTE completed and negative for structural or functional cardiac abnormalities  - Monitor on telemetry     #Hyponatremia  Moderate hyponatremia with Na 119 which has failed to improve with fluid restriction and salt tabs TID. Urine Na 20, most consistent with hypovolemic hypotonic hyponatremia  - 1L 0.9% NaCl fluid bolus given  - Recheck BMP and urine labs this afternoon  - Goal of <6meq Na increase     #Atrial fibrillation, chronic, rate controlled  Long standing history of atrial fibrillation and use of amiodarone for rate/rhythm control. Not currently on anticoagulation.  - Hold amiodarone per Cardiology  - Monitor on telemetry     #COVID + status  Tested positive prior to admission but remains asymptomatic. Received the vaccine. No respiratory support required at this time.  - CTM for respiratory symptoms  - Supplemental oxygen as needed  - Airborne precautions     #BPH  - Continue home finasteride, tamsulosin     #Hypothyroidism  TSH level on admission WNL.   - Continue home levothyroxine 112mcg daily     #HTN  BP elevated on admission and chronically per patient.  - Continue home clonidine 0.1mg BID and losartan 75mg daily  - PRN BP meds ordered as  well     #Normocytic anemia, mild  Unknown duration and etiology.  - Monitor with regular CBC, may consider anemia workup once medically stable     #Prophylaxis  - Heparin     #DISPOSITION  - Continued inpatient care for hyponatremia           Shayne Mccallum M.D.   PGY-1  [facility]

## 2021-09-15 LAB
ANION GAP SERPL CALC-SCNC: 8 MMOL/L (ref 7–16)
BUN SERPL-MCNC: 13 MG/DL (ref 8–22)
CALCIUM SERPL-MCNC: 8 MG/DL (ref 8.5–10.5)
CHLORIDE SERPL-SCNC: 91 MMOL/L (ref 96–112)
CHOLEST SERPL-MCNC: 154 MG/DL (ref 100–199)
CO2 SERPL-SCNC: 23 MMOL/L (ref 20–33)
CREAT SERPL-MCNC: 0.71 MG/DL (ref 0.5–1.4)
GLUCOSE SERPL-MCNC: 82 MG/DL (ref 65–99)
HDLC SERPL-MCNC: 77 MG/DL
LDLC SERPL CALC-MCNC: 69 MG/DL
POTASSIUM SERPL-SCNC: 4.5 MMOL/L (ref 3.6–5.5)
SODIUM SERPL-SCNC: 122 MMOL/L (ref 135–145)
TRIGL SERPL-MCNC: 42 MG/DL (ref 0–149)

## 2021-09-15 PROCEDURE — 700105 HCHG RX REV CODE 258: Performed by: STUDENT IN AN ORGANIZED HEALTH CARE EDUCATION/TRAINING PROGRAM

## 2021-09-15 PROCEDURE — 700111 HCHG RX REV CODE 636 W/ 250 OVERRIDE (IP): Performed by: STUDENT IN AN ORGANIZED HEALTH CARE EDUCATION/TRAINING PROGRAM

## 2021-09-15 PROCEDURE — 770020 HCHG ROOM/CARE - TELE (206)

## 2021-09-15 PROCEDURE — 80061 LIPID PANEL: CPT

## 2021-09-15 PROCEDURE — 700102 HCHG RX REV CODE 250 W/ 637 OVERRIDE(OP): Performed by: HEALTH CARE PROVIDER

## 2021-09-15 PROCEDURE — 36415 COLL VENOUS BLD VENIPUNCTURE: CPT

## 2021-09-15 PROCEDURE — 80048 BASIC METABOLIC PNL TOTAL CA: CPT

## 2021-09-15 PROCEDURE — 700102 HCHG RX REV CODE 250 W/ 637 OVERRIDE(OP): Performed by: STUDENT IN AN ORGANIZED HEALTH CARE EDUCATION/TRAINING PROGRAM

## 2021-09-15 PROCEDURE — A9270 NON-COVERED ITEM OR SERVICE: HCPCS | Performed by: HEALTH CARE PROVIDER

## 2021-09-15 PROCEDURE — A9270 NON-COVERED ITEM OR SERVICE: HCPCS | Performed by: STUDENT IN AN ORGANIZED HEALTH CARE EDUCATION/TRAINING PROGRAM

## 2021-09-15 RX ORDER — DEXTROMETHORPHAN POLISTIREX 30 MG/5ML
60 SUSPENSION ORAL 2 TIMES DAILY PRN
Status: DISCONTINUED | OUTPATIENT
Start: 2021-09-15 | End: 2021-09-16 | Stop reason: HOSPADM

## 2021-09-15 RX ORDER — SODIUM CHLORIDE 9 MG/ML
1000 INJECTION, SOLUTION INTRAVENOUS ONCE
Status: COMPLETED | OUTPATIENT
Start: 2021-09-15 | End: 2021-09-15

## 2021-09-15 RX ADMIN — LEVOTHYROXINE SODIUM 112 MCG: 0.11 TABLET ORAL at 05:00

## 2021-09-15 RX ADMIN — CLONIDINE HYDROCHLORIDE 0.1 MG: 0.1 TABLET ORAL at 05:01

## 2021-09-15 RX ADMIN — BISACODYL 10 MG: 10 SUPPOSITORY RECTAL at 14:53

## 2021-09-15 RX ADMIN — FINASTERIDE 5 MG: 5 TABLET, FILM COATED ORAL at 05:00

## 2021-09-15 RX ADMIN — Medication 2 G: at 11:22

## 2021-09-15 RX ADMIN — SODIUM CHLORIDE 1000 ML: 9 INJECTION, SOLUTION INTRAVENOUS at 09:00

## 2021-09-15 RX ADMIN — OMEPRAZOLE 20 MG: 20 CAPSULE, DELAYED RELEASE ORAL at 21:39

## 2021-09-15 RX ADMIN — HEPARIN SODIUM 5000 UNITS: 5000 INJECTION, SOLUTION INTRAVENOUS; SUBCUTANEOUS at 05:00

## 2021-09-15 RX ADMIN — DOCUSATE SODIUM 50 MG AND SENNOSIDES 8.6 MG 2 TABLET: 8.6; 5 TABLET, FILM COATED ORAL at 04:59

## 2021-09-15 RX ADMIN — HEPARIN SODIUM 5000 UNITS: 5000 INJECTION, SOLUTION INTRAVENOUS; SUBCUTANEOUS at 14:54

## 2021-09-15 RX ADMIN — CLONIDINE HYDROCHLORIDE 0.1 MG: 0.1 TABLET ORAL at 17:25

## 2021-09-15 RX ADMIN — HEPARIN SODIUM 5000 UNITS: 5000 INJECTION, SOLUTION INTRAVENOUS; SUBCUTANEOUS at 21:40

## 2021-09-15 RX ADMIN — SERTRALINE HYDROCHLORIDE 25 MG: 50 TABLET ORAL at 05:02

## 2021-09-15 RX ADMIN — Medication 2 G: at 07:53

## 2021-09-15 RX ADMIN — DOCUSATE SODIUM 50 MG AND SENNOSIDES 8.6 MG 2 TABLET: 8.6; 5 TABLET, FILM COATED ORAL at 17:25

## 2021-09-15 RX ADMIN — LOSARTAN POTASSIUM 75 MG: 50 TABLET, FILM COATED ORAL at 05:02

## 2021-09-15 RX ADMIN — Medication 2 G: at 17:25

## 2021-09-15 RX ADMIN — TAMSULOSIN HYDROCHLORIDE 0.4 MG: 0.4 CAPSULE ORAL at 07:53

## 2021-09-15 NOTE — PROGRESS NOTES
COVID-19 surge in effect, crisis charting implemented.     Bedside report received from night shift RN. Assumed patient care. No signs of distress at this time. Tele box on and rhythm verified. Safety precautions in place. Call light and personal belongings within reach. Educated patient to use call light if assistance is needed. Will continue to monitor.

## 2021-09-15 NOTE — PROGRESS NOTES
Bedside report received and patient care assumed. Pt is resting in bed, A&Ox4, with no complaints of pain, and is on RA. Tele box on. All fall precautions are in place, belongings at bedside table.  Pt was updated on POC, no questions or concerns. Pt educated on use of call light for assistance. Will continue to monitor.     COVID-19 surge in effect

## 2021-09-15 NOTE — PROGRESS NOTES
[facility] – [place], [place] FAM[facility] MEDICINE PROGRESS NOTE     Attending: Manasa Rooney M.D.  Senior Resident: Tripp Forman M.D. (PGY-3)  Margarito Resident: Shayne Mccallum M.D. (PGY-1)  PATIENT: Jerel Landrum; [id number]; 9/6/1931     ID: 89 y.o. COVID + male with past medical history of atrial fibrillation, hypertension, hyperthroidism, BPH was admitted on 09/11/201 following transfer from [facility] for several month history of dizziness and recently discovered sinus bradycardia     24 Hour Events: Orthostatic vital signs completed on 09/14/2021 and notable for significant reduction in BP. No acute events overnight. He has remained in sinus bradycardia.      Subjective: Pt denies new concerns at this time. No chest pain, SOB, fever, chills, or abdominal pain. He continues to experience intermittent lightheadedness but this has been stable for past 6 mo.    OBJECTIVE:  Temp:  [35.9 °C (96.6 °F)-36.3 °C (97.4 °F)] 36.1 °C (97 °F)  Pulse:  [47-55] 55  Resp:  [16-18] 18  BP: (125-175)/(59-85) 161/76  SpO2:  [93 %-95 %] 93 %    Intake/Output Summary (Last 24 hours) at 9/15/2021 0556  Last data filed at 9/14/2021 1737  Gross per 24 hour   Intake 980 ml   Output 1600 ml   Net -620 ml       PE:  Gen: No acute distress, resting comfortably in bed  HEENT: normocephalic, atraumatic, EOMI  Pulm: clear to auscultation bilaterally, no respiratory distress   Cardio: RRR, no M/R/G  Abdom: soft, nontender, nondistended, normoactive bowel sounds in all quadrants  Ext: No edema, 2+ pulses bilaterally  Neuro: Grossly intact       LABS:  Recent Labs     09/13/21  0545   WBC 5.6   RBC 3.40*   HEMOGLOBIN 11.4*   HEMATOCRIT 32.3*   MCV 95.0   MCH 33.5*   RDW 44.7   PLATELETCT 221   MPV 9.2   NEUTSPOLYS 76.50*   LYMPHOCYTES 9.60*   MONOCYTES 10.90   EOSINOPHILS 0.50   BASOPHILS 0.40     Recent Labs     09/13/21  0545 09/14/21  0240 09/14/21  1231   SODIUM 120* 119* 120*   POTASSIUM 4.0 4.3 4.5   CHLORIDE 89* 88* 90*   CO2 26 23 23   BUN 14 17 14   CREATININE 0.77 0.76 0.75    CALCIUM 8.4* 8.2* 8.0*   MAGNESIUM 1.9  --   --      Estimated GFR/CRCL = Estimated Creatinine Clearance: 63.3 mL/min (by C-G formula based on SCr of 0.75 mg/dL).  Recent Labs     09/13/21  0545 09/14/21  0240 09/14/21  1231   GLUCOSE 88 87 86                 No results for input(s): INR, APTT, FIBRINOGEN in the last 72 hours.    Invalid input(s): DIMER    MICROBIOLOGY:   No results found for: BLOODCULTU, BLDCULT, BCHOLD     IMAGING:   EC-ECHOCARDIOGRAM LTD W/O CONT   Final Result          MEDS:  Current Facility-Administered Medications   Medication Last Admin   • sodium chloride (SALT) tablet 2 g 2 g at 09/14/21 1735   • oxyCODONE immediate-release (ROXICODONE) tablet 2.5 mg     • losartan (COZAAR) tablet 75 mg 75 mg at 09/15/21 0502   • senna-docusate (PERICOLACE or SENOKOT S) 8.6-50 MG per tablet 2 Tablet 2 Tablet at 09/15/21 0459    And   • polyethylene glycol/lytes (MIRALAX) PACKET 1 Packet 1 Packet at 09/14/21 2114    And   • magnesium hydroxide (MILK OF MAGNESIA) suspension 30 mL      And   • bisacodyl (DULCOLAX) suppository 10 mg     • heparin injection 5,000 Units 5,000 Units at 09/15/21 0500   • acetaminophen (Tylenol) tablet 650 mg     • Pharmacy Consult Request ...Pain Management Review 1 Each     • enalaprilat (Vasotec) injection 1.25 mg 1 mL 1.25 mg at 09/13/21 1159   • ondansetron (ZOFRAN) syringe/vial injection 4 mg     • ondansetron (ZOFRAN ODT) dispertab 4 mg     • aspirin (ASA) chewable tab 81 mg 81 mg at 09/14/21 0525   • finasteride (PROSCAR) tablet 5 mg 5 mg at 09/15/21 0500   • omeprazole (PRILOSEC) capsule 20 mg 20 mg at 09/14/21 2114   • tamsulosin (FLOMAX) capsule 0.4 mg 0.4 mg at 09/14/21 0756   • sertraline (Zoloft) tablet 25 mg 25 mg at 09/15/21 0502   • cloNIDine (CATAPRES) tablet 0.1 mg 0.1 mg at 09/15/21 0501   • levothyroxine (SYNTHROID) tablet 112 mcg 112 mcg at 09/15/21 0500       PROBLEM LIST:  No problems updated.    ASSESSMENT/PLAN: 89 y.o. male with past medical history  of atrial fibrillation, hypertension, hyperthroidism, BPH was admitted for symptomatic bradycardia    #Hyponatremia  Moderate hyponatremia with Na 119 which has failed to improve with fluid restriction and salt tabs, improved to 122 with NS bolus. Urine Na 20, most consistent with hypovolemic hypotonic hyponatremia. May also consider thyroid dysfunction or sertraline use if Na fails to respond further.  - Lipids WNL  - Repeat 1L NS bolus + Salt tabs TID  - Goal of <6meq Na increase per 24hrs  - BMP q12h to monitor Na levels     #Bradycardia, symptomatic  Unknown duration but patient states that symptoms have been stable for > 6 months. He states that he recently finished a 2 week ambulatory event monitor recording with his home Cardiologist. No known history of CAD or ischemic events in recent past. ECHO WNL.  - Cardiology consulted and attempting contact with patient's cardiologist  - Hold amiodarone due to bradycardia  - Monitor on telemetry     #Orthostatic Hypotension  Orthostatics obtained on 09/15 which was notable for 31 pt systolic BP drop and 11pt diastolic BP drop in transition from supine to standing. Orthostasis may be contributing to intermittent lightheadedness in combination with stable bradycardia.  - continue IVF bolus as needed    #Atrial fibrillation, chronic, rate controlled  Long standing history of atrial fibrillation and use of amiodarone for rate/rhythm control. Not currently on anticoagulation.  - Hold amiodarone per Cardiology  - Monitor on telemetry     #COVID + status  Tested positive prior to admission but remains asymptomatic. Received the vaccine. No respiratory support required at this time.  - CTM for respiratory symptoms  - Supplemental oxygen as needed  - Airborne precautions     #BPH  - Continue home finasteride, tamsulosin     #Hypothyroidism  TSH level on admission WNL.   - Continue home levothyroxine 112mcg daily     #HTN  BP elevated on admission and chronically per  patient.  - Continue home clonidine 0.1mg BID and losartan 75mg daily  - PRN BP meds ordered as well     #Normocytic anemia, mild  Unknown duration and etiology.  - Monitor with regular CBC, may consider anemia workup once medically stable     #Prophylaxis  - Heparin     #DISPOSITION  - Continued inpatient care for hyponatremia           Shayne Mccallum M.D.   PGY-1  [facility]

## 2021-09-16 VITALS
SYSTOLIC BLOOD PRESSURE: 112 MMHG | WEIGHT: 154.32 LBS | BODY MASS INDEX: 23.39 KG/M2 | RESPIRATION RATE: 14 BRPM | DIASTOLIC BLOOD PRESSURE: 45 MMHG | OXYGEN SATURATION: 93 % | HEART RATE: 52 BPM | HEIGHT: 68 IN | TEMPERATURE: 97.4 F

## 2021-09-16 LAB
ANION GAP SERPL CALC-SCNC: 7 MMOL/L (ref 7–16)
BUN SERPL-MCNC: 18 MG/DL (ref 8–22)
CALCIUM SERPL-MCNC: 8 MG/DL (ref 8.5–10.5)
CHLORIDE SERPL-SCNC: 94 MMOL/L (ref 96–112)
CO2 SERPL-SCNC: 22 MMOL/L (ref 20–33)
CREAT SERPL-MCNC: 0.83 MG/DL (ref 0.5–1.4)
GLUCOSE SERPL-MCNC: 91 MG/DL (ref 65–99)
POTASSIUM SERPL-SCNC: 4.6 MMOL/L (ref 3.6–5.5)
SODIUM SERPL-SCNC: 123 MMOL/L (ref 135–145)

## 2021-09-16 PROCEDURE — 700102 HCHG RX REV CODE 250 W/ 637 OVERRIDE(OP): Performed by: STUDENT IN AN ORGANIZED HEALTH CARE EDUCATION/TRAINING PROGRAM

## 2021-09-16 PROCEDURE — 80048 BASIC METABOLIC PNL TOTAL CA: CPT

## 2021-09-16 PROCEDURE — 36415 COLL VENOUS BLD VENIPUNCTURE: CPT

## 2021-09-16 PROCEDURE — 97161 PT EVAL LOW COMPLEX 20 MIN: CPT

## 2021-09-16 PROCEDURE — 700102 HCHG RX REV CODE 250 W/ 637 OVERRIDE(OP): Performed by: HEALTH CARE PROVIDER

## 2021-09-16 PROCEDURE — 700111 HCHG RX REV CODE 636 W/ 250 OVERRIDE (IP): Performed by: STUDENT IN AN ORGANIZED HEALTH CARE EDUCATION/TRAINING PROGRAM

## 2021-09-16 PROCEDURE — A9270 NON-COVERED ITEM OR SERVICE: HCPCS | Performed by: HEALTH CARE PROVIDER

## 2021-09-16 PROCEDURE — 97116 GAIT TRAINING THERAPY: CPT

## 2021-09-16 PROCEDURE — A9270 NON-COVERED ITEM OR SERVICE: HCPCS | Performed by: STUDENT IN AN ORGANIZED HEALTH CARE EDUCATION/TRAINING PROGRAM

## 2021-09-16 RX ORDER — SODIUM CHLORIDE 1 G/1
2 TABLET ORAL
Qty: 90 TABLET | Refills: 0 | Status: SHIPPED | OUTPATIENT
Start: 2021-09-16

## 2021-09-16 RX ORDER — LOSARTAN POTASSIUM 25 MG/1
75 TABLET ORAL DAILY
Qty: 30 TABLET | Refills: 0 | Status: SHIPPED | OUTPATIENT
Start: 2021-09-17

## 2021-09-16 RX ORDER — ASPIRIN 81 MG/1
81 TABLET, CHEWABLE ORAL DAILY
Qty: 100 TABLET | Refills: 0 | Status: SHIPPED | OUTPATIENT
Start: 2021-09-17

## 2021-09-16 RX ORDER — LEVOTHYROXINE SODIUM 112 UG/1
112 TABLET ORAL
Qty: 30 TABLET | Refills: 0 | Status: SHIPPED | OUTPATIENT
Start: 2021-09-17

## 2021-09-16 RX ADMIN — LOSARTAN POTASSIUM 75 MG: 50 TABLET, FILM COATED ORAL at 05:25

## 2021-09-16 RX ADMIN — HEPARIN SODIUM 5000 UNITS: 5000 INJECTION, SOLUTION INTRAVENOUS; SUBCUTANEOUS at 13:52

## 2021-09-16 RX ADMIN — Medication 2 G: at 13:51

## 2021-09-16 RX ADMIN — Medication 2 G: at 07:57

## 2021-09-16 RX ADMIN — DEXTROMETHORPHAN 60 MG: 30 SUSPENSION, EXTENDED RELEASE ORAL at 00:18

## 2021-09-16 RX ADMIN — SERTRALINE HYDROCHLORIDE 25 MG: 50 TABLET ORAL at 05:24

## 2021-09-16 RX ADMIN — FINASTERIDE 5 MG: 5 TABLET, FILM COATED ORAL at 05:26

## 2021-09-16 RX ADMIN — HEPARIN SODIUM 5000 UNITS: 5000 INJECTION, SOLUTION INTRAVENOUS; SUBCUTANEOUS at 05:24

## 2021-09-16 RX ADMIN — CLONIDINE HYDROCHLORIDE 0.1 MG: 0.1 TABLET ORAL at 05:26

## 2021-09-16 RX ADMIN — TAMSULOSIN HYDROCHLORIDE 0.4 MG: 0.4 CAPSULE ORAL at 07:57

## 2021-09-16 RX ADMIN — LEVOTHYROXINE SODIUM 112 MCG: 0.11 TABLET ORAL at 05:26

## 2021-09-16 RX ADMIN — ASPIRIN 81 MG: 81 TABLET, CHEWABLE ORAL at 05:24

## 2021-09-16 ASSESSMENT — COGNITIVE AND FUNCTIONAL STATUS - GENERAL
SUGGESTED CMS G CODE MODIFIER MOBILITY: CH
MOBILITY SCORE: 24

## 2021-09-16 ASSESSMENT — PAIN DESCRIPTION - PAIN TYPE: TYPE: ACUTE PAIN

## 2021-09-16 ASSESSMENT — GAIT ASSESSMENTS
ASSISTIVE DEVICE: FRONT WHEEL WALKER
DEVIATION: BRADYKINETIC
GAIT LEVEL OF ASSIST: SUPERVISED
DISTANCE (FEET): 80

## 2021-09-16 NOTE — THERAPY
"Physical Therapy   Initial Evaluation     Patient Name: Jerel Landrum  Age:  89 y.o., Sex:  male  Medical Record #: [id number]  Today's Date: 9/16/2021     Precautions  Precautions: (P) Fall Risk    Assessment  Jerel Landrum is a 89 y.o. male admitted on 9/11/2021 as transfer from outside hospital for evaluation of symptomatic bradycardia, found to have persistent bradycardia in setting of amiodarone use, orthostatic hypotension, and resistant hyponatremia.  Patient is indep at baseline and today is able to demo safe household mobility w/FWW and no LOB. He has been in bed for over a week here at the hospital. Unclear why PT orders weren't placed sooner. He has a supportive family and is safe for DC home w/FWW and OP PT when medically stable. No further acute care PT needs at this time.     Plan  DC Equipment Recommendations: Front-Wheel Walker  Discharge Recommendations: Recommend outpatient physical therapy services to address higher level deficits       Subjective    \"I've been ;aying in bed for a week now\"     Objective       09/16/21 1200   Initial Contact Note    Initial Contact Note Order Received and Verified, Evaluation Only - Patient Does Not Require Further Acute Physical Therapy at this Time.  However, May Benefit from Post Acute Therapy for Higher Level Functional Deficits.   Precautions   Precautions Fall Risk   Vitals   Patient BP Position Standing 1 minute   Blood Pressure  112/45   O2 Delivery Device None - Room Air   Pain 0 - 10 Group   Location Back   Therapist Pain Assessment 3;Post Activity Pain Same as Prior to Activity   Prior Living Situation   Prior Services None;Home-Independent   Housing / Facility 1 Story House   Steps Into Home 0   Steps In Home 1   Equipment Owned Single Point Cane   Lives with - Patient's Self Care Capacity Alone and Able to Care For Self   Comments patient's daughter and granddaughter live nearby and can assist. He also has a girlfriend who can provide some assist if " needed    Prior Level of Functional Mobility   Bed Mobility Independent   Transfer Status Independent   Ambulation Independent   Distance Ambulation (Feet)   (community distances )   Assistive Devices Used Single Point Cane   Comments Patient recently started to use a SPC 2/2 R RONALD from 20 years ago giving him pain. He goes dancing with his GF every week    History of Falls   History of Falls Yes   Date of Last Fall   (reports he fell in his [employer] parking lot last week )   Cognition    Cognition / Consciousness X   Speech/ Communication Hard of Hearing   Comments     Passive ROM Lower Body   Passive ROM Lower Body WDL   Active ROM Lower Body    Active ROM Lower Body  WDL   Strength Lower Body   Lower Body Strength  WDL   Sensation Lower Body   Lower Extremity Sensation   WDL   Strength Upper Body   Upper Body Strength  WDL   Balance Assessment   Sitting Balance (Static) Fair +   Sitting Balance (Dynamic) Fair +   Standing Balance (Static) Fair   Standing Balance (Dynamic) Fair   Weight Shift Sitting Fair   Weight Shift Standing Fair   Comments w/FWW   Gait Analysis   Gait Level Of Assist Supervised   Assistive Device Front Wheel Walker   Distance (Feet) 80   # of Times Distance was Traveled 1   Deviation Bradykinetic   Comments limited by being in room only 2/2 COVID+   Bed Mobility    Supine to Sit Supervised   Scooting Supervised   Functional Mobility   Sit to Stand Supervised   Bed, Chair, Wheelchair Transfer Supervised   How much difficulty does the patient currently have...   Turning over in bed (including adjusting bedclothes, sheets and blankets)? 4   Sitting down on and standing up from a chair with arms (e.g., wheelchair, bedside commode, etc.) 4   Moving from lying on back to sitting on the side of the bed? 4   How much help from another person does the patient currently need...   Moving to and from a bed to a chair (including a wheelchair)? 4   Need to walk in a hospital room? 4   Climbing 3-5 steps with  a railing? 4   6 clicks Mobility Score 24   Activity Tolerance   Sitting in Chair post session    Sitting Edge of Bed 5 min    Standing 10 min    Education Group   Education Provided Role of Physical Therapist;Use of Assistive Device;Gait Training   Role of Physical Therapist Patient Response Patient;Acceptance;Explanation;Demonstration;Verbal Demonstration   Gait Training Patient Response Patient;Acceptance;Explanation;Demonstration;Verbal Demonstration;Action Demonstration   Use of Assistive Device Patient Response Patient;Acceptance;Explanation;Demonstration;Verbal Demonstration;Action Demonstration   Anticipated Discharge Equipment and Recommendations   DC Equipment Recommendations Front-Wheel Walker   Discharge Recommendations Recommend outpatient physical therapy services to address higher level deficits       Lilia Lopez, PT, DPT, GCS

## 2021-09-16 NOTE — PROGRESS NOTES
Monitoring Summary:  Rhythm: SB-SR 50-62 bpm  Ectopy: PAC(r)PVC(r)/ low 37 bpm  Measurements: 0.19/0.07/0.49

## 2021-09-16 NOTE — NON-PROVIDER
[facility] [place]     Attending:   Dr. Unique MD    Resident:   Dr. Dickson MD (PGY-3)    PATIENT:   Jerel Landrum; [id number]; 9/6/1931    SUBJECTIVE:   No acute events overnight. Patient is resting comfortably in bed. He remains bradycardic on tele monitoring. He does not have any new complaints. He is eating and hydrating well.     OBJECTIVE:  Vitals:    09/15/21 2312 09/16/21 0444 09/16/21 0752 09/16/21 1059   BP: 152/69 152/68 147/66 115/45   Pulse: (!) 55 (!) 52 (!) 50 (!) 52   Resp: 18 16 14 14   Temp: 37.2 °C (98.9 °F) 36.3 °C (97.3 °F) 36.8 °C (98.3 °F) 36.3 °C (97.4 °F)   TempSrc: Temporal Temporal Oral Temporal   SpO2: 94% 92% 93% 93%   Weight:       Height:           Intake/Output Summary (Last 24 hours) at 9/16/2021 1145  Last data filed at 9/16/2021 1000  Gross per 24 hour   Intake 720 ml   Output 1100 ml   Net -380 ml       PHYSICAL EXAM:   General: No acute distress, afebrile, resting comfortably, conversational   HEENT: NC/AT. EOMI.   Cardiovascular: RRR without murmurs, rubs, heaves. Normal capillary refill   Respiratory: CTAB, no tachypnea or retractions   Abdomen: normal bowel sounds, soft, nontender, nondistended, no masses, no organomegaly   EXT:  ROSS, no edema  Skin: No erythema/lesions   Neuro: Non-focal, alert and orientated       LABS:  No results for input(s): WBC, RBC, HEMOGLOBIN, HEMATOCRIT, MCV, MCH, RDW, PLATELETCT, MPV, NEUTSPOLYS, LYMPHOCYTES, MONOCYTES, EOSINOPHILS, BASOPHILS, RBCMORPHOLO in the last 72 hours.  Recent Labs     09/14/21  1231 09/15/21  0553 09/16/21  0100   SODIUM 120* 122* 123*   POTASSIUM 4.5 4.5 4.6   CHLORIDE 90* 91* 94*   CO2 23 23 22   BUN 14 13 18   CREATININE 0.75 0.71 0.83   CALCIUM 8.0* 8.0* 8.0*     Estimated GFR/CRCL = Estimated Creatinine Clearance: 57.2 mL/min (by C-G formula based on SCr of 0.83 mg/dL).  Recent Labs     09/14/21  1231 09/15/21  0553 09/16/21  0100   GLUCOSE 86 82 91                 No results for input(s): INR,  APTT, FIBRINOGEN in the last 72 hours.    Invalid input(s): DIMER    MICROBIOLOGY:   No results found for: BLOODCULTU, BLDCULT, BCHOLD     IMAGING:   EC-ECHOCARDIOGRAM LTD W/O CONT   Final Result          CULTURES:   Results     ** No results found for the last 168 hours. **          MEDS:  Current Facility-Administered Medications   Medication Last Admin   • Dextromethorphan Polistirex ER (DELSYM) 30 MG/5ML suspension 60 mg 60 mg at 09/16/21 0018   • sodium chloride (SALT) tablet 2 g 2 g at 09/16/21 0757   • oxyCODONE immediate-release (ROXICODONE) tablet 2.5 mg     • losartan (COZAAR) tablet 75 mg 75 mg at 09/16/21 0525   • senna-docusate (PERICOLACE or SENOKOT S) 8.6-50 MG per tablet 2 Tablet 2 Tablet at 09/15/21 1725    And   • polyethylene glycol/lytes (MIRALAX) PACKET 1 Packet 1 Packet at 09/14/21 2114    And   • magnesium hydroxide (MILK OF MAGNESIA) suspension 30 mL      And   • bisacodyl (DULCOLAX) suppository 10 mg 10 mg at 09/15/21 1453   • heparin injection 5,000 Units 5,000 Units at 09/16/21 0524   • acetaminophen (Tylenol) tablet 650 mg     • Pharmacy Consult Request ...Pain Management Review 1 Each     • enalaprilat (Vasotec) injection 1.25 mg 1 mL 1.25 mg at 09/13/21 1159   • ondansetron (ZOFRAN) syringe/vial injection 4 mg     • ondansetron (ZOFRAN ODT) dispertab 4 mg     • aspirin (ASA) chewable tab 81 mg 81 mg at 09/16/21 0524   • finasteride (PROSCAR) tablet 5 mg 5 mg at 09/16/21 0526   • omeprazole (PRILOSEC) capsule 20 mg 20 mg at 09/15/21 2139   • tamsulosin (FLOMAX) capsule 0.4 mg 0.4 mg at 09/16/21 0757   • sertraline (Zoloft) tablet 25 mg 25 mg at 09/16/21 0524   • cloNIDine (CATAPRES) tablet 0.1 mg 0.1 mg at 09/16/21 0526   • levothyroxine (SYNTHROID) tablet 112 mcg 112 mcg at 09/16/21 0526       ASSESSMENT/PLAN: Jerel Landrum 89 y.o. male with PMH of HTN, Afib, and hypothyroidism transferred from [place] for symptomatic bradycardia with near syncope on 9/11. Patient is hospital day 5 and  has been persistently hyponatremic.     #Hyponatremia  Patient's sodium this morning increased from 122 to 123. He has been getting salt tabs and he received 1L NS infusion yesterday. Per multiple case reports, Covid-19 infection may be contributing to his hyponatremia.  Per patient's daughter, he is chronically hyponatremic and this is not an acute issue for him.Will confirm with patient's PCP that he has been chronically hyponatremic in the past. If this is true, he is likely stable for discharge this afternoon.  - Continue salt tabs  - Patient is likely stable for discharge with outpatient follow up and continued salt tab use    #Bradycardia  #Dizziness  Patient remains bradycardic in the low 50s. He maintained heart rate of 60-69 overnight. Patient has confirmed orthostatic hypotension and has been cautioned to be careful when trying to stand up and walk around. He is otherwise stable and has not had any syncopal or near-syncopal events.  - Patient can follow up with his outpatient cardiologist  - Clonidine may be contributing to his bradycardia, will confirm with his primary APRN that this is a home medication and discuss continuation vs discontinuation of this medication.    #HTN  - Continue losartan  - Consult with patient's PCP if clonidine is a home medication that should be continued    #Afib  - Hold amiodarone, follow up with outpatient cardiologist     #BPH  - continue tamsulosin and finasteride    #Hypothyroidism  -Continue 112 mcg levothyroxine    #Discharge planning  - PT to evaluate   - Patient's family is able to pick him up this afternoon    Disposition: Discharge home likely this afternoon    Karla June, MS3  [facility] [place]

## 2021-09-16 NOTE — PROGRESS NOTES
"  [facility] PROGRESS NOTE     Attending: Vira Calhoun MD  Senior Resident: Tripp Forman MD (PGY-3)  [place] Resident: Shayne Mccallum MD (PGY-1)    PATIENT: Jerel Landrum; [id number]; 9/6/1931    Subjective: No acute overnight events. Jerel denies concerns for physician this morning. States that he was independent at home prior to this hospitalization; family live down street and visit frequently.     Spoke with daughter via telephone who reports that Jerel has \"always\" had low sodium prior to recent hospitalizations, as far back as several years. Provided physician with contact information for his APRN.    OBJECTIVE:  Temp:  [36.3 °C (97.3 °F)-37.2 °C (98.9 °F)] 36.3 °C (97.4 °F)  Pulse:  [50-69] 52  Resp:  [14-20] 14  BP: (113-152)/(45-69) 115/45  SpO2:  [92 %-95 %] 93 %    Intake/Output Summary (Last 24 hours) at 9/16/2021 1152  Last data filed at 9/16/2021 1000  Gross per 24 hour   Intake 720 ml   Output 1100 ml   Net -380 ml       PE:  General: Well appearing man in no acute distress, resting on arrival to room  HEENT: Normocephalic, atraumatic, nares patent  Cardiovascular: RRR, no murmurs, gallops, or rubs  Pulmonary: CTAB, symmetrical chest expansion, no rales, rhonchi, or wheezes  Abdominal: Normoactive bowel sounds, non-tender to palpation, no guarding, rigidity, or distension  Extremities: Moves all spontaneously, bilateral calves non-tender to palpation, no pedal edema  Neurological: Alert and oriented    LABS:  Recent Labs     09/14/21  1231 09/15/21  0553 09/16/21  0100   SODIUM 120* 122* 123*   POTASSIUM 4.5 4.5 4.6   CHLORIDE 90* 91* 94*   CO2 23 23 22   BUN 14 13 18   CREATININE 0.75 0.71 0.83   CALCIUM 8.0* 8.0* 8.0*     Estimated GFR/CRCL = Estimated Creatinine Clearance: 57.2 mL/min (by C-G formula based on SCr of 0.83 mg/dL).  Recent Labs     09/14/21  1231 09/15/21  0553 09/16/21  0100   GLUCOSE 86 82 91     IMAGING:  EC-ECHOCARDIOGRAM LTD W/O CONT   Final Result    "       MEDS:  Current Facility-Administered Medications   Medication Last Admin   • Dextromethorphan Polistirex ER (DELSYM) 30 MG/5ML suspension 60 mg 60 mg at 09/16/21 0018   • sodium chloride (SALT) tablet 2 g 2 g at 09/16/21 0757   • oxyCODONE immediate-release (ROXICODONE) tablet 2.5 mg     • losartan (COZAAR) tablet 75 mg 75 mg at 09/16/21 0525   • senna-docusate (PERICOLACE or SENOKOT S) 8.6-50 MG per tablet 2 Tablet 2 Tablet at 09/15/21 1725    And   • polyethylene glycol/lytes (MIRALAX) PACKET 1 Packet 1 Packet at 09/14/21 2114    And   • magnesium hydroxide (MILK OF MAGNESIA) suspension 30 mL      And   • bisacodyl (DULCOLAX) suppository 10 mg 10 mg at 09/15/21 1453   • heparin injection 5,000 Units 5,000 Units at 09/16/21 0524   • acetaminophen (Tylenol) tablet 650 mg     • Pharmacy Consult Request ...Pain Management Review 1 Each     • enalaprilat (Vasotec) injection 1.25 mg 1 mL 1.25 mg at 09/13/21 1159   • ondansetron (ZOFRAN) syringe/vial injection 4 mg     • ondansetron (ZOFRAN ODT) dispertab 4 mg     • aspirin (ASA) chewable tab 81 mg 81 mg at 09/16/21 0524   • finasteride (PROSCAR) tablet 5 mg 5 mg at 09/16/21 0526   • omeprazole (PRILOSEC) capsule 20 mg 20 mg at 09/15/21 2139   • tamsulosin (FLOMAX) capsule 0.4 mg 0.4 mg at 09/16/21 0757   • sertraline (Zoloft) tablet 25 mg 25 mg at 09/16/21 0524   • cloNIDine (CATAPRES) tablet 0.1 mg 0.1 mg at 09/16/21 0526   • levothyroxine (SYNTHROID) tablet 112 mcg 112 mcg at 09/16/21 0526       ASSESSMENT/PLAN: Jerel Landrum is a 89 y.o. male admitted on 9/11/2021 as transfer from outside hospital for evaluation of symptomatic bradycardia, found to have persistent bradycardia in setting of amiodarone use, orthostatic hypotension, and resistant hyponatremia.      #Asymptomatic Hyponatremia   Moderate persistent hyponatremia despite fluid boluses, salt tablets, and water restriction. 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 Some case reports of COVID contributing to SIADH, however family history more suspicious for chronic hyponatremia.  - Will contact primary care provider to determine chronicity and whether patient can follow up in their office early next week  - Salt tabs 2g PO TID  - Goal of <6meq Na increase per 24hrs     #Bradycardia  Unknown duration but patient states that symptoms have been stable for > 6 months. He states that he recently finished a 2 week ambulatory event monitor recording with his home Cardiologist. No known history of CAD or ischemic events in recent past. ECHO WNL.  - Cardiology consulted, discontinued amiodaraone, recommends outpatient follow-up due to alfonso half-life (~40 days) of amiodarone  - Monitor on telemetry     #Orthostatic Hypotension  Orthostatics obtained on 09/15 which was notable for 31 pt systolic BP drop and 11pt diastolic BP drop in transition from supine to standing. Orthostasis may be contributing to intermittent lightheadedness in combination with stable bradycardia.  - Will provide teaching prior to discharge     #Atrial fibrillation, chronic, rate controlled  Long standing history of atrial fibrillation and use of amiodarone for rate/rhythm control. Not currently on anticoagulation.  - Hold amiodarone per Cardiology  - Monitor on telemetry     #COVID +  Tested positive prior to admission but remains asymptomatic. Received the vaccine. No respiratory support required at this time.  - CTM for respiratory symptoms  - Supplemental oxygen if needed  - Airborne precautions     #BPH  - Continue home finasteride, tamsulosin     #Hypothyroidism  TSH level on admission WNL.   - Continue home levothyroxine 112mcg daily     #HTN  BP elevated on admission and chronically per patient. Per Pharmacy review, clonidine may be partially contributing to bradycardia.  - Will discuss clonidine with patient's APRN prior to discharge  - Continue home  clonidine 0.1mg BID and losartan 75mg daily     #Normocytic anemia, mild  Unknown duration and etiology, though suspect chronic. Stable here.   - Outpatient follow-up    #Diet / Fluids  - Cardiac diet    #Bowel Regimen  - Senna/docusate, polyethylene glycol, milk of magnesia, bisacodyl PRN     #DVT Prophylaxis  - Heparin Q8H    #Disposition  - Pending PT evaluation/recommendations and conversation with patient's primary care provider, anticipate discharge to home this afternoon versus tomorrow    Tripp Foramn M.D.  Family Medicine Resident  PGY-3

## 2021-09-16 NOTE — PROGRESS NOTES
Patient discharged home with son. Discharged via wheelchair to [facility]. Patient was A&Ox4. All lines removed. Discharge plan discussed. Patient educated when to call MD for worsening symptoms. Patient also educated on when to call 911. Patient belongings discharged with patient. Tele box removed. Monitor room notified.

## 2021-09-16 NOTE — DISCHARGE PLANNING
Anticipated Discharge Disposition: Home    Action: RN CM met with the patient and family at bedside to discuss the discharge plan.  RN CM delivered 2nd IMM to the patient, patient provided verbal signature at 1415.  RN CM went over DME choice with the patient, patient provided choice for [facility].  RN CM explained that HH will need to be arranged in [place] with through the patient's PCP, patient and family verbalized understanding.  Choice form and IMM faxed to [facility].    Barriers to Discharge: None     Plan: Case coordination available to discuss any further discharge barriers.

## 2021-09-16 NOTE — PROGRESS NOTES
Received bedside report from RN, pt care assumed, VSS, pt assessment complete. Pt AAOx4, with no c/o of pain at this time. No signs of acute distress noted at this time. Plan of care discussed with pt and verbalizes no questions. Pt denies any additional needs at this time. Bed locked/in lowest position, bed alarm on, pt educated on fall risk and verbalized understanding, call light within reach, hourly rounding initiated.       COVID - 19 surge in effect.

## 2021-09-16 NOTE — PROGRESS NOTES
Assumed care of PT A&O 4. Pt resting in bed with no signs of labored breathing. On RA. Tele monitor in place, cardiac rhythm being monitored. Call light within reach, bed in lowest position, upper bed rails up. Pt was updated on plan of care for the day . Will continue to monitor.   COVID 19 surge in effect.

## 2021-09-16 NOTE — DISCHARGE INSTRUCTIONS
Discharge Instructions    Discharged to home by car with relative. Discharged via wheelchair, hospital escort: Yes.  Special equipment needed: Walker    Be sure to schedule a follow-up appointment with your primary care doctor or any specialists as instructed.     Discharge Plan:   Diet Plan: Discussed  Activity Level: Discussed  Confirmed Follow up Appointment: Patient to Call and Schedule Appointment  Confirmed Symptoms Management: Discussed  Medication Reconciliation Updated: Yes    I understand that a diet low in cholesterol, fat, and sodium is recommended for good health. Unless I have been given specific instructions below for another diet, I accept this instruction as my diet prescription.   Other diet: high sodium    Special Instructions: None    · Is patient discharged on Warfarin / Coumadin?   No     Depression / Suicide Risk    As you are discharged from this [facility] facility, it is important to learn how to keep safe from harming yourself.    Recognize the warning signs:  · Abrupt changes in personality, positive or negative- including increase in energy   · Giving away possessions  · Change in eating patterns- significant weight changes-  positive or negative  · Change in sleeping patterns- unable to sleep or sleeping all the time   · Unwillingness or inability to communicate  · Depression  · Unusual sadness, discouragement and loneliness  · Talk of wanting to die  · Neglect of personal appearance   · Rebelliousness- reckless behavior  · Withdrawal from people/activities they love  · Confusion- inability to concentrate     If you or a loved one observes any of these behaviors or has concerns about self-harm, here's what you can do:  · Talk about it- your feelings and reasons for harming yourself  · Remove any means that you might use to hurt yourself (examples: pills, rope, extension cords, firearm)  · Get professional help from the community (Mental Health, Substance Abuse, psychological  counseling)  · Do not be alone:Call your Safe Contact- someone whom you trust who will be there for you.  · Call your local CRISIS HOTLINE 886-9805 or 065-897-5110  · Call your local Children's Mobile Crisis Response Team [place] (388) 712-8329 or www.[employer]  · Call the toll free National Suicide Prevention Hotlines   · National Suicide Prevention Lifeline 665-346-LAKN (3599)  · [employer] Line Network 800-SUICIDE (127-1782)    Bradycardia, Adult  Bradycardia is a slower-than-normal heartbeat. A normal resting heart rate for an adult ranges from 60 to 100 beats per minute. With bradycardia, the resting heart rate is less than 60 beats per minute.  Bradycardia can prevent enough oxygen from reaching certain areas of your body when you are active. It can be serious if it keeps enough oxygen from reaching your brain and other parts of your body. Bradycardia is not a problem for everyone. For some healthy adults, a slow resting heart rate is normal.  What are the causes?  This condition may be caused by:  · A problem with the heart, including:  ? A problem with the heart's electrical system, such as a heart block. With a heart block, electrical signals between the chambers of the heart are partially or completely blocked, so they are not able to work as they should.  ? A problem with the heart's natural pacemaker (sinus node).  ? Heart disease.  ? A heart attack.  ? Heart damage.  ? Lyme disease.  ? A heart infection.  ? A heart condition that is present at birth (congenital heart defect).  · Certain medicines that treat heart conditions.  · Certain conditions, such as hypothyroidism and obstructive sleep apnea.  · Problems with the balance of chemicals and other substances, like potassium, in the blood.  · Trauma.  · Radiation therapy.  What increases the risk?  You are more likely to develop this condition if you:  · Are age 65 or older.  · Have high blood pressure (hypertension), high cholesterol  (hyperlipidemia), or diabetes.  · Drink heavily, use tobacco or nicotine products, or use drugs.  What are the signs or symptoms?  Symptoms of this condition include:  · Light-headedness.  · Feeling faint or fainting.  · Fatigue and weakness.  · Trouble with activity or exercise.  · Shortness of breath.  · Chest pain (angina).  · Drowsiness.  · Confusion.  · Dizziness.  How is this diagnosed?  This condition may be diagnosed based on:  · Your symptoms.  · Your medical history.  · A physical exam.  During the exam, your health care provider will listen to your heartbeat and check your pulse. To confirm the diagnosis, your health care provider may order tests, such as:  · Blood tests.  · An electrocardiogram (ECG). This test records the heart's electrical activity. The test can show how fast your heart is beating and whether the heartbeat is steady.  · A test in which you wear a portable device (event recorder or Holter monitor) to record your heart's electrical activity while you go about your day.  · An exercise test.  How is this treated?  Treatment for this condition depends on the cause of the condition and how severe your symptoms are. Treatment may involve:  · Treatment of the underlying condition.  · Changing your medicines or how much medicine you take.  · Having a small, battery-operated device called a pacemaker implanted under the skin. When bradycardia occurs, this device can be used to increase your heart rate and help your heart beat in a regular rhythm.  Follow these instructions at home:  Lifestyle    · Manage any health conditions that contribute to bradycardia as told by your health care provider.  · Follow a heart-healthy diet. A nutrition specialist (dietitian) can help educate you about healthy food options and changes.  · Follow an exercise program that is approved by your health care provider.  · Maintain a healthy weight.  · Try to reduce or manage your stress, such as with yoga or meditation.  If you need help reducing stress, ask your health care provider.  · Do not use any products that contain nicotine or tobacco, such as cigarettes, e-cigarettes, and chewing tobacco. If you need help quitting, ask your health care provider.  · Do not use illegal drugs.  · Limit alcohol intake to no more than 1 drink a day for nonpregnant women and 2 drinks a day for men. Be aware of how much alcohol is in your drink. In the [place] one drink equals one 12 oz bottle of beer (355 mL), one 5 oz glass of wine (148 mL), or one 1½ oz glass of hard liquor (44 mL).  General instructions  · Take over-the-counter and prescription medicines only as told by your health care provider.  · Keep all follow-up visits as told by your health care provider. This is important.  How is this prevented?  In some cases, bradycardia may be prevented by:  · Treating underlying medical problems.  · Stopping behaviors or medicines that can trigger the condition.  Contact a health care provider if you:  · Feel light-headed or dizzy.  · Almost faint.  · Feel weak or are easily fatigued during physical activity.  · Experience confusion or have memory problems.  Get help right away if:  · You faint.  · You have:  ? An irregular heartbeat (palpitations).  ? Chest pain.  ? Trouble breathing.  Summary  · Bradycardia is a slower-than-normal heartbeat. With bradycardia, the resting heart rate is less than 60 beats per minute.  · Treatment for this condition depends on the cause.  · Manage any health conditions that contribute to bradycardia as told by your health care provider.  · Do not use any products that contain nicotine or tobacco, such as cigarettes, e-cigarettes, and chewing tobacco, and limit alcohol intake.  · Keep all follow-up visits as told by your health care provider. This is important.  This information is not intended to replace advice given to you by your health care provider. Make sure you discuss any questions you have with your health  care provider.  Document Released: 09/09/2003 Document Revised: 07/01/2019 Document Reviewed: 05/29/2019  Elsevier Patient Education © 2020 [employer]      Orthostatic Hypotension  Blood pressure is a measurement of how strongly, or weakly, your blood is pressing against the walls of your arteries. Orthostatic hypotension is a sudden drop in blood pressure that happens when you quickly change positions, such as when you get up from sitting or lying down.  Arteries are blood vessels that carry blood from your heart throughout your body. When blood pressure is too low, you may not get enough blood to your brain or to the rest of your organs. This can cause weakness, light-headedness, rapid heartbeat, and fainting. This can last for just a few seconds or for up to a few minutes. Orthostatic hypotension is usually not a serious problem. However, if it happens frequently or gets worse, it may be a sign of something more serious.  What are the causes?  This condition may be caused by:  · Sudden changes in posture, such as standing up quickly after you have been sitting or lying down.  · Blood loss.  · Loss of body fluids (dehydration).  · Heart problems.  · Hormone (endocrine) problems.  · Pregnancy.  · Severe infection.  · Lack of certain nutrients.  · Severe allergic reactions (anaphylaxis).  · Certain medicines, such as blood pressure medicine or medicines that make the body lose excess fluids (diuretics). Sometimes, this condition can be caused by not taking medicine as directed, such as taking too much of a certain medicine.  What increases the risk?  The following factors may make you more likely to develop this condition:  · Age. Risk increases as you get older.  · Conditions that affect the heart or the central nervous system.  · Taking certain medicines, such as blood pressure medicine or diuretics.  · Being pregnant.  What are the signs or symptoms?  Symptoms of this condition may  "include:  · Weakness.  · Light-headedness.  · Dizziness.  · Blurred vision.  · Fatigue.  · Rapid heartbeat.  · Fainting, in severe cases.  How is this diagnosed?  This condition is diagnosed based on:  · Your medical history.  · Your symptoms.  · Your blood pressure measurement. Your health care provider will check your blood pressure when you are:  ? Lying down.  ? Sitting.  ? Standing.  A blood pressure reading is recorded as two numbers, such as \"120 over 80\" (or 120/80). The first (\"top\") number is called the systolic pressure. It is a measure of the pressure in your arteries as your heart beats. The second (\"bottom\") number is called the diastolic pressure. It is a measure of the pressure in your arteries when your heart relaxes between beats. Blood pressure is measured in a unit called mm Hg. Healthy blood pressure for most adults is 120/80. If your blood pressure is below 90/60, you may be diagnosed with hypotension.  Other information or tests that may be used to diagnose orthostatic hypotension include:  · Your other vital signs, such as your heart rate and temperature.  · Blood tests.  · Tilt table test. For this test, you will be safely secured to a table that moves you from a lying position to an upright position. Your heart rhythm and blood pressure will be monitored during the test.  How is this treated?  This condition may be treated by:  · Changing your diet. This may involve eating more salt (sodium) or drinking more water.  · Taking medicines to raise your blood pressure.  · Changing the dosage of certain medicines you are taking that might be lowering your blood pressure.  · Wearing compression stockings. These stockings help to prevent blood clots and reduce swelling in your legs.  In some cases, you may need to go to the hospital for:  · Fluid replacement. This means you will receive fluids through an IV.  · Blood replacement. This means you will receive donated blood through an IV " (transfusion).  · Treating an infection or heart problems, if this applies.  · Monitoring. You may need to be monitored while medicines that you are taking wear off.  Follow these instructions at home:  Eating and drinking    · Drink enough fluid to keep your urine pale yellow.  · Eat a healthy diet, and follow instructions from your health care provider about eating or drinking restrictions. A healthy diet includes:  ? Fresh fruits and vegetables.  ? Whole grains.  ? Lean meats.  ? Low-fat dairy products.  · Eat extra salt only as directed. Do not add extra salt to your diet unless your health care provider told you to do that.  · Eat frequent, small meals.  · Avoid standing up suddenly after eating.  Medicines  · Take over-the-counter and prescription medicines only as told by your health care provider.  ? Follow instructions from your health care provider about changing the dosage of your current medicines, if this applies.  ? Do not stop or adjust any of your medicines on your own.  General instructions    · Wear compression stockings as told by your health care provider.  · Get up slowly from lying down or sitting positions. This gives your blood pressure a chance to adjust.  · Avoid hot showers and excessive heat as directed by your health care provider.  · Return to your normal activities as told by your health care provider. Ask your health care provider what activities are safe for you.  · Do not use any products that contain nicotine or tobacco, such as cigarettes, e-cigarettes, and chewing tobacco. If you need help quitting, ask your health care provider.  · Keep all follow-up visits as told by your health care provider. This is important.  Contact a health care provider if you:  · Vomit.  · Have diarrhea.  · Have a fever for more than 2-3 days.  · Feel more thirsty than usual.  · Feel weak and tired.  Get help right away if you:  · Have chest pain.  · Have a fast or irregular heartbeat.  · Develop  "numbness in any part of your body.  · Cannot move your arms or your legs.  · Have trouble speaking.  · Become sweaty or feel light-headed.  · Faint.  · Feel short of breath.  · Have trouble staying awake.  · Feel confused.  Summary  · Orthostatic hypotension is a sudden drop in blood pressure that happens when you quickly change positions.  · Orthostatic hypotension is usually not a serious problem.  · It is diagnosed by having your blood pressure taken lying down, sitting, and then standing.  · It may be treated by changing your diet or adjusting your medicines.  This information is not intended to replace advice given to you by your health care provider. Make sure you discuss any questions you have with your health care provider.  Document Released: 12/08/2003 Document Revised: 06/13/2019 Document Reviewed: 06/13/2019  [employer] Patient Education © 2020 [employer]  Walker  Your doctor has recommended that you use a walker. A walker is useful when you cannot put your full weight on one leg or foot, or when your balance is a problem. You should be sure your walker is adjusted to the proper height, and use it whenever you walk. For non-weight bearing, hold your injured foot off the floor, and lift or roll the walker forward about one foot, \"plant\" all 4 legs of the walker, and support your weight on your hands. Then move your good foot forward to the center of the walker, and repeat.  If you can bear partial weight or if you are using the walker to help with your balance, start in the center of the walker, lift or roll it about one foot forward, and \"plant\" the 4 legs on the floor. Step forward, using the hand support to help keep your balance. If you are post-op or have an injury to your extremity, step forward with that leg first, then bring your good leg forward to the center of the walker, and repeat.  Document Released: 12/18/2006 Document Revised: 03/11/2013 Document Reviewed: 05/30/2008  ExitCare® Patient " Information ©2013 [facility].

## 2021-09-16 NOTE — PROGRESS NOTES
Monitor Summary     Rhythm SB/SR  HR Range 50-63  Ectopy Rare PAC, Rare PVC, bigem  Measurements 0.17 / 0.10 / 0.45